# Patient Record
Sex: FEMALE | Race: OTHER | HISPANIC OR LATINO
[De-identification: names, ages, dates, MRNs, and addresses within clinical notes are randomized per-mention and may not be internally consistent; named-entity substitution may affect disease eponyms.]

---

## 2019-04-30 ENCOUNTER — APPOINTMENT (OUTPATIENT)
Dept: ANTEPARTUM | Facility: CLINIC | Age: 34
End: 2019-04-30
Payer: COMMERCIAL

## 2019-04-30 PROCEDURE — 76820 UMBILICAL ARTERY ECHO: CPT

## 2019-04-30 PROCEDURE — 76819 FETAL BIOPHYS PROFIL W/O NST: CPT

## 2019-04-30 PROCEDURE — 76817 TRANSVAGINAL US OBSTETRIC: CPT

## 2019-04-30 PROCEDURE — 76816 OB US FOLLOW-UP PER FETUS: CPT

## 2019-05-21 ENCOUNTER — APPOINTMENT (OUTPATIENT)
Dept: ANTEPARTUM | Facility: CLINIC | Age: 34
End: 2019-05-21
Payer: COMMERCIAL

## 2019-05-21 PROCEDURE — 76817 TRANSVAGINAL US OBSTETRIC: CPT

## 2019-05-21 PROCEDURE — 76819 FETAL BIOPHYS PROFIL W/O NST: CPT

## 2019-05-21 PROCEDURE — 76820 UMBILICAL ARTERY ECHO: CPT

## 2019-05-25 ENCOUNTER — OUTPATIENT (OUTPATIENT)
Dept: OUTPATIENT SERVICES | Facility: HOSPITAL | Age: 34
LOS: 1 days | End: 2019-05-25
Payer: COMMERCIAL

## 2019-05-25 DIAGNOSIS — O26.899 OTHER SPECIFIED PREGNANCY RELATED CONDITIONS, UNSPECIFIED TRIMESTER: ICD-10-CM

## 2019-05-25 DIAGNOSIS — Z3A.00 WEEKS OF GESTATION OF PREGNANCY NOT SPECIFIED: ICD-10-CM

## 2019-05-25 LAB — GLUCOSE BLDC GLUCOMTR-MCNC: 108 MG/DL — HIGH (ref 70–99)

## 2019-05-25 PROCEDURE — 82962 GLUCOSE BLOOD TEST: CPT

## 2019-05-25 PROCEDURE — 94760 N-INVAS EAR/PLS OXIMETRY 1: CPT

## 2019-05-25 PROCEDURE — 59025 FETAL NON-STRESS TEST: CPT

## 2019-05-25 PROCEDURE — 76819 FETAL BIOPHYS PROFIL W/O NST: CPT

## 2019-05-25 PROCEDURE — 99214 OFFICE O/P EST MOD 30 MIN: CPT

## 2019-05-29 ENCOUNTER — APPOINTMENT (OUTPATIENT)
Dept: ANTEPARTUM | Facility: CLINIC | Age: 34
End: 2019-05-29
Payer: COMMERCIAL

## 2019-05-29 PROCEDURE — 76818 FETAL BIOPHYS PROFILE W/NST: CPT

## 2019-05-29 PROCEDURE — 76820 UMBILICAL ARTERY ECHO: CPT

## 2019-06-03 ENCOUNTER — OUTPATIENT (OUTPATIENT)
Dept: OUTPATIENT SERVICES | Facility: HOSPITAL | Age: 34
LOS: 1 days | End: 2019-06-03
Payer: COMMERCIAL

## 2019-06-03 DIAGNOSIS — Z01.818 ENCOUNTER FOR OTHER PREPROCEDURAL EXAMINATION: ICD-10-CM

## 2019-06-03 LAB
BLD GP AB SCN SERPL QL: POSITIVE — SIGNIFICANT CHANGE UP
BLD GP AB SCN SERPL QL: POSITIVE — SIGNIFICANT CHANGE UP
HCT VFR BLD CALC: 39.5 % — SIGNIFICANT CHANGE UP (ref 34.5–45)
HGB BLD-MCNC: 13 G/DL — SIGNIFICANT CHANGE UP (ref 11.5–15.5)
MCHC RBC-ENTMCNC: 31.4 PG — SIGNIFICANT CHANGE UP (ref 27–34)
MCHC RBC-ENTMCNC: 32.9 GM/DL — SIGNIFICANT CHANGE UP (ref 32–36)
MCV RBC AUTO: 95.4 FL — SIGNIFICANT CHANGE UP (ref 80–100)
NRBC # BLD: 0 /100 WBCS — SIGNIFICANT CHANGE UP (ref 0–0)
PLATELET # BLD AUTO: 227 K/UL — SIGNIFICANT CHANGE UP (ref 150–400)
RBC # BLD: 4.14 M/UL — SIGNIFICANT CHANGE UP (ref 3.8–5.2)
RBC # FLD: 14.6 % — HIGH (ref 10.3–14.5)
RH IG SCN BLD-IMP: NEGATIVE — SIGNIFICANT CHANGE UP
RH IG SCN BLD-IMP: NEGATIVE — SIGNIFICANT CHANGE UP
WBC # BLD: 9.25 K/UL — SIGNIFICANT CHANGE UP (ref 3.8–10.5)
WBC # FLD AUTO: 9.25 K/UL — SIGNIFICANT CHANGE UP (ref 3.8–10.5)

## 2019-06-03 PROCEDURE — 86077 PHYS BLOOD BANK SERV XMATCH: CPT

## 2019-06-04 ENCOUNTER — INPATIENT (INPATIENT)
Facility: HOSPITAL | Age: 34
LOS: 3 days | Discharge: HOME | End: 2019-06-08
Attending: OBSTETRICS & GYNECOLOGY | Admitting: OBSTETRICS & GYNECOLOGY
Payer: COMMERCIAL

## 2019-06-04 ENCOUNTER — TRANSCRIPTION ENCOUNTER (OUTPATIENT)
Age: 34
End: 2019-06-04

## 2019-06-04 ENCOUNTER — RESULT REVIEW (OUTPATIENT)
Age: 34
End: 2019-06-04

## 2019-06-04 VITALS
TEMPERATURE: 97 F | DIASTOLIC BLOOD PRESSURE: 55 MMHG | HEART RATE: 70 BPM | OXYGEN SATURATION: 96 % | RESPIRATION RATE: 18 BRPM | SYSTOLIC BLOOD PRESSURE: 101 MMHG

## 2019-06-04 LAB
BASOPHILS # BLD AUTO: 0.03 K/UL — SIGNIFICANT CHANGE UP (ref 0–0.2)
BASOPHILS NFR BLD AUTO: 0.4 % — SIGNIFICANT CHANGE UP (ref 0–2)
BLD GP AB SCN SERPL QL: POSITIVE — SIGNIFICANT CHANGE UP
BLD GP AB SCN SERPL QL: POSITIVE — SIGNIFICANT CHANGE UP
EOSINOPHIL # BLD AUTO: 0.06 K/UL — SIGNIFICANT CHANGE UP (ref 0–0.5)
EOSINOPHIL NFR BLD AUTO: 0.8 % — SIGNIFICANT CHANGE UP (ref 0–6)
GLUCOSE BLDC GLUCOMTR-MCNC: 71 MG/DL — SIGNIFICANT CHANGE UP (ref 70–99)
HCT VFR BLD CALC: 40 % — SIGNIFICANT CHANGE UP (ref 34.5–45)
HGB BLD-MCNC: 13.3 G/DL — SIGNIFICANT CHANGE UP (ref 11.5–15.5)
IMM GRANULOCYTES NFR BLD AUTO: 1.3 % — SIGNIFICANT CHANGE UP (ref 0–1.5)
LYMPHOCYTES # BLD AUTO: 1.52 K/UL — SIGNIFICANT CHANGE UP (ref 1–3.3)
LYMPHOCYTES # BLD AUTO: 19.2 % — SIGNIFICANT CHANGE UP (ref 13–44)
MCHC RBC-ENTMCNC: 31.2 PG — SIGNIFICANT CHANGE UP (ref 27–34)
MCHC RBC-ENTMCNC: 33.3 GM/DL — SIGNIFICANT CHANGE UP (ref 32–36)
MCV RBC AUTO: 93.9 FL — SIGNIFICANT CHANGE UP (ref 80–100)
MONOCYTES # BLD AUTO: 0.92 K/UL — HIGH (ref 0–0.9)
MONOCYTES NFR BLD AUTO: 11.6 % — SIGNIFICANT CHANGE UP (ref 2–14)
NEUTROPHILS # BLD AUTO: 5.29 K/UL — SIGNIFICANT CHANGE UP (ref 1.8–7.4)
NEUTROPHILS NFR BLD AUTO: 66.7 % — SIGNIFICANT CHANGE UP (ref 43–77)
NRBC # BLD: 0 /100 WBCS — SIGNIFICANT CHANGE UP (ref 0–0)
PLATELET # BLD AUTO: 207 K/UL — SIGNIFICANT CHANGE UP (ref 150–400)
RBC # BLD: 4.26 M/UL — SIGNIFICANT CHANGE UP (ref 3.8–5.2)
RBC # FLD: 14.6 % — HIGH (ref 10.3–14.5)
RH IG SCN BLD-IMP: NEGATIVE — SIGNIFICANT CHANGE UP
RH IG SCN BLD-IMP: NEGATIVE — SIGNIFICANT CHANGE UP
T PALLIDUM AB TITR SER: NEGATIVE — SIGNIFICANT CHANGE UP
WBC # BLD: 7.92 K/UL — SIGNIFICANT CHANGE UP (ref 3.8–10.5)
WBC # FLD AUTO: 7.92 K/UL — SIGNIFICANT CHANGE UP (ref 3.8–10.5)

## 2019-06-04 PROCEDURE — 86870 RBC ANTIBODY IDENTIFICATION: CPT

## 2019-06-04 PROCEDURE — 86850 RBC ANTIBODY SCREEN: CPT

## 2019-06-04 PROCEDURE — 86900 BLOOD TYPING SEROLOGIC ABO: CPT

## 2019-06-04 PROCEDURE — 85027 COMPLETE CBC AUTOMATED: CPT

## 2019-06-04 PROCEDURE — 86780 TREPONEMA PALLIDUM: CPT

## 2019-06-04 PROCEDURE — 86901 BLOOD TYPING SEROLOGIC RH(D): CPT

## 2019-06-04 PROCEDURE — 86880 COOMBS TEST DIRECT: CPT

## 2019-06-04 RX ORDER — NALOXONE HYDROCHLORIDE 4 MG/.1ML
0.1 SPRAY NASAL
Refills: 0 | Status: DISCONTINUED | OUTPATIENT
Start: 2019-06-04 | End: 2019-06-08

## 2019-06-04 RX ORDER — CEFAZOLIN SODIUM 1 G
2000 VIAL (EA) INJECTION ONCE
Refills: 0 | Status: COMPLETED | OUTPATIENT
Start: 2019-06-04 | End: 2019-06-04

## 2019-06-04 RX ORDER — MAGNESIUM HYDROXIDE 400 MG/1
30 TABLET, CHEWABLE ORAL
Refills: 0 | Status: DISCONTINUED | OUTPATIENT
Start: 2019-06-04 | End: 2019-06-08

## 2019-06-04 RX ORDER — KETOROLAC TROMETHAMINE 30 MG/ML
30 SYRINGE (ML) INJECTION EVERY 6 HOURS
Refills: 0 | Status: DISCONTINUED | OUTPATIENT
Start: 2019-06-04 | End: 2019-06-05

## 2019-06-04 RX ORDER — DIPHENHYDRAMINE HCL 50 MG
25 CAPSULE ORAL EVERY 6 HOURS
Refills: 0 | Status: DISCONTINUED | OUTPATIENT
Start: 2019-06-04 | End: 2019-06-08

## 2019-06-04 RX ORDER — SODIUM CHLORIDE 9 MG/ML
1000 INJECTION, SOLUTION INTRAVENOUS
Refills: 0 | Status: DISCONTINUED | OUTPATIENT
Start: 2019-06-04 | End: 2019-06-08

## 2019-06-04 RX ORDER — OXYCODONE HYDROCHLORIDE 5 MG/1
5 TABLET ORAL ONCE
Refills: 0 | Status: DISCONTINUED | OUTPATIENT
Start: 2019-06-04 | End: 2019-06-08

## 2019-06-04 RX ORDER — OXYCODONE HYDROCHLORIDE 5 MG/1
5 TABLET ORAL
Refills: 0 | Status: COMPLETED | OUTPATIENT
Start: 2019-06-04 | End: 2019-06-11

## 2019-06-04 RX ORDER — IBUPROFEN 200 MG
600 TABLET ORAL EVERY 6 HOURS
Refills: 0 | Status: COMPLETED | OUTPATIENT
Start: 2019-06-04 | End: 2020-05-02

## 2019-06-04 RX ORDER — HEPARIN SODIUM 5000 [USP'U]/ML
5000 INJECTION INTRAVENOUS; SUBCUTANEOUS EVERY 12 HOURS
Refills: 0 | Status: DISCONTINUED | OUTPATIENT
Start: 2019-06-05 | End: 2019-06-08

## 2019-06-04 RX ORDER — TETANUS TOXOID, REDUCED DIPHTHERIA TOXOID AND ACELLULAR PERTUSSIS VACCINE, ADSORBED 5; 2.5; 8; 8; 2.5 [IU]/.5ML; [IU]/.5ML; UG/.5ML; UG/.5ML; UG/.5ML
0.5 SUSPENSION INTRAMUSCULAR ONCE
Refills: 0 | Status: DISCONTINUED | OUTPATIENT
Start: 2019-06-04 | End: 2019-06-08

## 2019-06-04 RX ORDER — ONDANSETRON 8 MG/1
4 TABLET, FILM COATED ORAL EVERY 6 HOURS
Refills: 0 | Status: DISCONTINUED | OUTPATIENT
Start: 2019-06-04 | End: 2019-06-08

## 2019-06-04 RX ORDER — FAMOTIDINE 10 MG/ML
20 INJECTION INTRAVENOUS ONCE
Refills: 0 | Status: DISCONTINUED | OUTPATIENT
Start: 2019-06-04 | End: 2019-06-08

## 2019-06-04 RX ORDER — SIMETHICONE 80 MG/1
80 TABLET, CHEWABLE ORAL EVERY 4 HOURS
Refills: 0 | Status: DISCONTINUED | OUTPATIENT
Start: 2019-06-04 | End: 2019-06-08

## 2019-06-04 RX ORDER — LANOLIN
1 OINTMENT (GRAM) TOPICAL EVERY 6 HOURS
Refills: 0 | Status: DISCONTINUED | OUTPATIENT
Start: 2019-06-04 | End: 2019-06-08

## 2019-06-04 RX ORDER — OXYTOCIN 10 UNIT/ML
333.33 VIAL (ML) INJECTION
Qty: 20 | Refills: 0 | Status: DISCONTINUED | OUTPATIENT
Start: 2019-06-04 | End: 2019-06-08

## 2019-06-04 RX ORDER — SODIUM CHLORIDE 9 MG/ML
1000 INJECTION, SOLUTION INTRAVENOUS ONCE
Refills: 0 | Status: DISCONTINUED | OUTPATIENT
Start: 2019-06-04 | End: 2019-06-08

## 2019-06-04 RX ORDER — METOCLOPRAMIDE HCL 10 MG
10 TABLET ORAL ONCE
Refills: 0 | Status: DISCONTINUED | OUTPATIENT
Start: 2019-06-04 | End: 2019-06-08

## 2019-06-04 RX ORDER — DOCUSATE SODIUM 100 MG
100 CAPSULE ORAL
Refills: 0 | Status: DISCONTINUED | OUTPATIENT
Start: 2019-06-04 | End: 2019-06-08

## 2019-06-04 RX ORDER — ACETAMINOPHEN 500 MG
975 TABLET ORAL EVERY 6 HOURS
Refills: 0 | Status: DISCONTINUED | OUTPATIENT
Start: 2019-06-04 | End: 2019-06-08

## 2019-06-04 RX ORDER — CITRIC ACID/SODIUM CITRATE 300-500 MG
30 SOLUTION, ORAL ORAL ONCE
Refills: 0 | Status: COMPLETED | OUTPATIENT
Start: 2019-06-04 | End: 2019-06-04

## 2019-06-04 RX ORDER — DEXAMETHASONE 0.5 MG/5ML
4 ELIXIR ORAL EVERY 6 HOURS
Refills: 0 | Status: DISCONTINUED | OUTPATIENT
Start: 2019-06-04 | End: 2019-06-08

## 2019-06-04 RX ORDER — GLYCERIN ADULT
1 SUPPOSITORY, RECTAL RECTAL AT BEDTIME
Refills: 0 | Status: DISCONTINUED | OUTPATIENT
Start: 2019-06-04 | End: 2019-06-08

## 2019-06-04 RX ORDER — MORPHINE SULFATE 50 MG/1
0.3 CAPSULE, EXTENDED RELEASE ORAL ONCE
Refills: 0 | Status: DISCONTINUED | OUTPATIENT
Start: 2019-06-04 | End: 2019-06-08

## 2019-06-04 RX ADMIN — Medication 30 MILLIGRAM(S): at 19:38

## 2019-06-04 RX ADMIN — Medication 100 MILLIGRAM(S): at 18:35

## 2019-06-04 RX ADMIN — Medication 30 MILLILITER(S): at 18:35

## 2019-06-04 NOTE — DISCHARGE NOTE OB - CARE PROVIDER_API CALL
Dayron Soliz)  Obstetrics and Gynecology  [address] [place] [id number]  Phone: [phone]  Fax: [phone]  Follow Up Time:

## 2019-06-04 NOTE — DISCHARGE NOTE OB - CARE PLAN
Goal:	Home  Assessment and plan of treatment:	Nothing per vagina no  heagvfy liftign for  six  weeks  no  sex  no  tub  baths  no swimming Principal Discharge DX:	Postpartum state  Goal:	Home  Assessment and plan of treatment:	Meeting all postpartum milestones. No heavy lifting. Pelvic rest until cleared. Follow-up with obstetrician in 1-2 weeks.

## 2019-06-04 NOTE — DISCHARGE NOTE OB - PATIENT PORTAL LINK FT
You can access the [facility] Patient Portal, offered by [facility], by registering with the following website: [email or web address]

## 2019-06-04 NOTE — DISCHARGE NOTE OB - PLAN OF CARE
Home Nothing per vagina no  heagvfy liftign for  six  weeks  no  sex  no  tub  baths  no swimming Meeting all postpartum milestones. No heavy lifting. Pelvic rest until cleared. Follow-up with obstetrician in 1-2 weeks.

## 2019-06-05 LAB
BASOPHILS # BLD AUTO: 0.03 K/UL — SIGNIFICANT CHANGE UP (ref 0–0.2)
BASOPHILS NFR BLD AUTO: 0.3 % — SIGNIFICANT CHANGE UP (ref 0–2)
EOSINOPHIL # BLD AUTO: 0.07 K/UL — SIGNIFICANT CHANGE UP (ref 0–0.5)
EOSINOPHIL NFR BLD AUTO: 0.8 % — SIGNIFICANT CHANGE UP (ref 0–6)
HCT VFR BLD CALC: 35.1 % — SIGNIFICANT CHANGE UP (ref 34.5–45)
HGB BLD-MCNC: 11.4 G/DL — LOW (ref 11.5–15.5)
IMM GRANULOCYTES NFR BLD AUTO: 0.5 % — SIGNIFICANT CHANGE UP (ref 0–1.5)
LYMPHOCYTES # BLD AUTO: 1.31 K/UL — SIGNIFICANT CHANGE UP (ref 1–3.3)
LYMPHOCYTES # BLD AUTO: 14.1 % — SIGNIFICANT CHANGE UP (ref 13–44)
MCHC RBC-ENTMCNC: 31.2 PG — SIGNIFICANT CHANGE UP (ref 27–34)
MCHC RBC-ENTMCNC: 32.5 GM/DL — SIGNIFICANT CHANGE UP (ref 32–36)
MCV RBC AUTO: 96.2 FL — SIGNIFICANT CHANGE UP (ref 80–100)
MONOCYTES # BLD AUTO: 0.93 K/UL — HIGH (ref 0–0.9)
MONOCYTES NFR BLD AUTO: 10 % — SIGNIFICANT CHANGE UP (ref 2–14)
NEUTROPHILS # BLD AUTO: 6.93 K/UL — SIGNIFICANT CHANGE UP (ref 1.8–7.4)
NEUTROPHILS NFR BLD AUTO: 74.3 % — SIGNIFICANT CHANGE UP (ref 43–77)
NRBC # BLD: 0 /100 WBCS — SIGNIFICANT CHANGE UP (ref 0–0)
PLATELET # BLD AUTO: 174 K/UL — SIGNIFICANT CHANGE UP (ref 150–400)
RBC # BLD: 3.65 M/UL — LOW (ref 3.8–5.2)
RBC # FLD: 14.2 % — SIGNIFICANT CHANGE UP (ref 10.3–14.5)
T PALLIDUM AB TITR SER: NEGATIVE — SIGNIFICANT CHANGE UP
WBC # BLD: 9.32 K/UL — SIGNIFICANT CHANGE UP (ref 3.8–10.5)
WBC # FLD AUTO: 9.32 K/UL — SIGNIFICANT CHANGE UP (ref 3.8–10.5)

## 2019-06-05 RX ORDER — IBUPROFEN 200 MG
600 TABLET ORAL EVERY 6 HOURS
Refills: 0 | Status: DISCONTINUED | OUTPATIENT
Start: 2019-06-05 | End: 2019-06-08

## 2019-06-05 RX ORDER — ZINC OXIDE 200 MG/G
1 OINTMENT TOPICAL
Refills: 0 | Status: DISCONTINUED | OUTPATIENT
Start: 2019-06-05 | End: 2019-06-08

## 2019-06-05 RX ADMIN — Medication 975 MILLIGRAM(S): at 01:08

## 2019-06-05 RX ADMIN — Medication 975 MILLIGRAM(S): at 15:01

## 2019-06-05 RX ADMIN — Medication 25 MILLIGRAM(S): at 06:27

## 2019-06-05 RX ADMIN — HEPARIN SODIUM 5000 UNIT(S): 5000 INJECTION INTRAVENOUS; SUBCUTANEOUS at 17:43

## 2019-06-05 RX ADMIN — Medication 975 MILLIGRAM(S): at 14:31

## 2019-06-05 RX ADMIN — ZINC OXIDE 1 APPLICATION(S): 200 OINTMENT TOPICAL at 14:31

## 2019-06-05 RX ADMIN — Medication 975 MILLIGRAM(S): at 07:19

## 2019-06-05 RX ADMIN — Medication 30 MILLIGRAM(S): at 11:02

## 2019-06-05 RX ADMIN — Medication 30 MILLIGRAM(S): at 18:14

## 2019-06-05 RX ADMIN — Medication 975 MILLIGRAM(S): at 06:23

## 2019-06-05 RX ADMIN — Medication 975 MILLIGRAM(S): at 21:04

## 2019-06-05 RX ADMIN — Medication 30 MILLIGRAM(S): at 04:06

## 2019-06-05 RX ADMIN — Medication 30 MILLIGRAM(S): at 17:44

## 2019-06-05 RX ADMIN — HEPARIN SODIUM 5000 UNIT(S): 5000 INJECTION INTRAVENOUS; SUBCUTANEOUS at 06:23

## 2019-06-05 RX ADMIN — Medication 30 MILLIGRAM(S): at 03:14

## 2019-06-05 RX ADMIN — Medication 975 MILLIGRAM(S): at 22:00

## 2019-06-05 RX ADMIN — Medication 30 MILLIGRAM(S): at 10:32

## 2019-06-05 RX ADMIN — SIMETHICONE 80 MILLIGRAM(S): 80 TABLET, CHEWABLE ORAL at 06:22

## 2019-06-05 RX ADMIN — Medication 975 MILLIGRAM(S): at 00:07

## 2019-06-05 NOTE — LACTATION INITIAL EVALUATION - PRO FEM REPRO BREAST PUMP YN
Problem: [place] (,NICU)  Goal: Signs and Symptoms of Listed Potential Problems Will be Absent or Manageable ()  Outcome: Ongoing (interventions implemented as appropriate)    17      Problems Assessed () all   Problems Present ([place]) none         Problem: Patient Care Overview (Infant)  Goal: Plan of Care Review  Outcome: Ongoing (interventions implemented as appropriate)    17   Coping/Psychosocial Response   Care Plan Reviewed With mother   Patient Care Overview   Progress improving   Outcome Evaluation   Outcome Summary/Follow up Plan stable. bottling well. questions answered.             yes/Provided

## 2019-06-06 RX ORDER — OXYCODONE HYDROCHLORIDE 5 MG/1
5 TABLET ORAL
Refills: 0 | Status: DISCONTINUED | OUTPATIENT
Start: 2019-06-06 | End: 2019-06-08

## 2019-06-06 RX ORDER — ZINC OXIDE 200 MG/G
1 OINTMENT TOPICAL
Refills: 0 | Status: DISCONTINUED | OUTPATIENT
Start: 2019-06-06 | End: 2019-06-08

## 2019-06-06 RX ADMIN — OXYCODONE HYDROCHLORIDE 5 MILLIGRAM(S): 5 TABLET ORAL at 22:32

## 2019-06-06 RX ADMIN — Medication 100 MILLIGRAM(S): at 00:20

## 2019-06-06 RX ADMIN — Medication 975 MILLIGRAM(S): at 22:25

## 2019-06-06 RX ADMIN — Medication 975 MILLIGRAM(S): at 15:12

## 2019-06-06 RX ADMIN — Medication 600 MILLIGRAM(S): at 01:30

## 2019-06-06 RX ADMIN — Medication 975 MILLIGRAM(S): at 09:01

## 2019-06-06 RX ADMIN — HEPARIN SODIUM 5000 UNIT(S): 5000 INJECTION INTRAVENOUS; SUBCUTANEOUS at 06:17

## 2019-06-06 RX ADMIN — Medication 600 MILLIGRAM(S): at 00:20

## 2019-06-06 RX ADMIN — Medication 600 MILLIGRAM(S): at 12:33

## 2019-06-06 RX ADMIN — ZINC OXIDE 1 APPLICATION(S): 200 OINTMENT TOPICAL at 12:04

## 2019-06-06 RX ADMIN — Medication 600 MILLIGRAM(S): at 07:06

## 2019-06-06 RX ADMIN — ZINC OXIDE 1 APPLICATION(S): 200 OINTMENT TOPICAL at 18:10

## 2019-06-06 RX ADMIN — Medication 975 MILLIGRAM(S): at 03:05

## 2019-06-06 RX ADMIN — Medication 600 MILLIGRAM(S): at 12:03

## 2019-06-06 RX ADMIN — Medication 975 MILLIGRAM(S): at 14:42

## 2019-06-06 RX ADMIN — Medication 975 MILLIGRAM(S): at 09:31

## 2019-06-06 RX ADMIN — Medication 975 MILLIGRAM(S): at 03:50

## 2019-06-06 RX ADMIN — HEPARIN SODIUM 5000 UNIT(S): 5000 INJECTION INTRAVENOUS; SUBCUTANEOUS at 18:06

## 2019-06-06 RX ADMIN — Medication 975 MILLIGRAM(S): at 23:40

## 2019-06-06 RX ADMIN — OXYCODONE HYDROCHLORIDE 5 MILLIGRAM(S): 5 TABLET ORAL at 21:36

## 2019-06-06 RX ADMIN — Medication 100 MILLIGRAM(S): at 21:36

## 2019-06-06 RX ADMIN — SIMETHICONE 80 MILLIGRAM(S): 80 TABLET, CHEWABLE ORAL at 00:20

## 2019-06-06 RX ADMIN — Medication 600 MILLIGRAM(S): at 18:36

## 2019-06-06 RX ADMIN — Medication 600 MILLIGRAM(S): at 06:17

## 2019-06-06 RX ADMIN — Medication 600 MILLIGRAM(S): at 18:06

## 2019-06-07 LAB — SURGICAL PATHOLOGY STUDY: SIGNIFICANT CHANGE UP

## 2019-06-07 RX ADMIN — Medication 600 MILLIGRAM(S): at 07:36

## 2019-06-07 RX ADMIN — OXYCODONE HYDROCHLORIDE 5 MILLIGRAM(S): 5 TABLET ORAL at 21:56

## 2019-06-07 RX ADMIN — Medication 975 MILLIGRAM(S): at 05:02

## 2019-06-07 RX ADMIN — Medication 600 MILLIGRAM(S): at 12:30

## 2019-06-07 RX ADMIN — HEPARIN SODIUM 5000 UNIT(S): 5000 INJECTION INTRAVENOUS; SUBCUTANEOUS at 06:51

## 2019-06-07 RX ADMIN — Medication 975 MILLIGRAM(S): at 14:59

## 2019-06-07 RX ADMIN — Medication 975 MILLIGRAM(S): at 04:05

## 2019-06-07 RX ADMIN — Medication 600 MILLIGRAM(S): at 02:10

## 2019-06-07 RX ADMIN — Medication 975 MILLIGRAM(S): at 20:16

## 2019-06-07 RX ADMIN — Medication 975 MILLIGRAM(S): at 22:40

## 2019-06-07 RX ADMIN — Medication 975 MILLIGRAM(S): at 15:55

## 2019-06-07 RX ADMIN — Medication 600 MILLIGRAM(S): at 06:51

## 2019-06-07 RX ADMIN — Medication 600 MILLIGRAM(S): at 11:44

## 2019-06-07 RX ADMIN — OXYCODONE HYDROCHLORIDE 5 MILLIGRAM(S): 5 TABLET ORAL at 22:40

## 2019-06-07 RX ADMIN — Medication 600 MILLIGRAM(S): at 01:40

## 2019-06-07 RX ADMIN — Medication 975 MILLIGRAM(S): at 09:00

## 2019-06-07 RX ADMIN — Medication 975 MILLIGRAM(S): at 10:00

## 2019-06-07 RX ADMIN — Medication 600 MILLIGRAM(S): at 18:18

## 2019-06-07 RX ADMIN — ZINC OXIDE 1 APPLICATION(S): 200 OINTMENT TOPICAL at 15:11

## 2019-06-08 VITALS
OXYGEN SATURATION: 98 % | DIASTOLIC BLOOD PRESSURE: 77 MMHG | HEART RATE: 77 BPM | RESPIRATION RATE: 18 BRPM | SYSTOLIC BLOOD PRESSURE: 120 MMHG | TEMPERATURE: 97 F

## 2019-06-08 RX ADMIN — Medication 600 MILLIGRAM(S): at 06:25

## 2019-06-08 RX ADMIN — Medication 600 MILLIGRAM(S): at 00:23

## 2019-06-08 RX ADMIN — Medication 975 MILLIGRAM(S): at 04:06

## 2019-06-08 RX ADMIN — Medication 600 MILLIGRAM(S): at 01:23

## 2019-06-08 RX ADMIN — HEPARIN SODIUM 5000 UNIT(S): 5000 INJECTION INTRAVENOUS; SUBCUTANEOUS at 06:25

## 2019-06-08 RX ADMIN — Medication 975 MILLIGRAM(S): at 03:05

## 2019-06-08 NOTE — PROGRESS NOTE ADULT - SUBJECTIVE AND OBJECTIVE BOX
Patient evaluated at bedside. She has been ambulating without assistance to NICU, voiding spontaneously, passing gas, tolerating regular diet.   Baby is currently in NICU.  She reports pain is well controlled with motrin and tylenol  She denies headache, dizziness, chest pain, palpitations, shortness of breathe, nausea, vomiting or heavy vaginal bleeding.      Physical Exam:  Vital Signs Last 24 Hrs  T(C): 36.7 (05 Jun 2019 10:41), Max: 36.9 (05 Jun 2019 05:41)  T(F): 98.1 (05 Jun 2019 10:41), Max: 98.4 (05 Jun 2019 05:41)  HR: 70 (05 Jun 2019 10:41) (60 - 81)  BP: 92/58 (05 Jun 2019 10:41) (91/57 - 124/59)  BP(mean): --  RR: 18 (05 Jun 2019 10:41) (17 - 18)  SpO2: 97% (05 Jun 2019 10:41) (96% - 100%)    Constitutional: Alert & Oriented x3, No acute distress, cooperative   Gastrointestinal: soft, mildly tender, positive bowel sounds, no rebound or guarding; uterus firm at midline,  Incision: steristrips in place; area clean, dry and intact; no erythema or induration   : ochia WNL   Extremities: no calf tenderness or swelling                          11.4   9.32  )-----------( 174      ( 05 Jun 2019 07:31 )             35.1
 Section Operative Note      Pre-Op Diagnosis:  Placenta  previa, polyhydramnios, a1  gdm,  Unable  to locate  FH after spinal  anesthsia ,  non reassuring fetal  status     Post-Op Diagnosis:Same       Time Out: 	[x ] Performed		[ ]Not Performed:  Procedure: 	 Section: 	[ ] Repeat 	#_______	[x ] Primary         [ ]Emergency	        [ ]Other____________:  Uterine incision:         [ x]Low Transverse C/S	[ ]Low Vertical C/S           [ ]Classical C/S							  Additional Procedures: 	[ ] Bilateral Tubal Ligation.  Type:______________  Other:	[ ]Lysis of Adhesions   	[ ]C-Hysterectomy          [ ]Other__________________:  Surgeon:  Dr. Soliz                                                   .	Assist:   ____R Lindsey_______________.                                                                             .   Anesthesia: ______Farhatviniskicolby________________________	Type: [ ]Epidural  [ x] Spinal   [ ] General	[ ]Other:  IV Fluids:IVRL 2000cc  20 upitocin  2 g  ancef 		Urine Output:	75cc	Link:  [ x] Yes [ ]No [ ] Other:  EBL:    	900cc	  Delivery findings:    [ x] Live 	[ ]Non-Viable: 	[ ]MALE   [x ]FEMALE, Infant. APGAR    9          AND    9             .  [x ] Peds at delivery.  [ ]MULTIPLE GESTATION -NOTES:      POSITION:      LOP                  PRESENTATION            VTX                    .  DELIVERED BY:  	[ x]HEAD 		[ ]BREECH 	[ ]OTHER:  		[x ]MANUAL 		[x ]Failed VACUUM	[ ] OTHER:	  PLACENTA: 	[x ]Removed	[ x]Uterus explored		[x ]Empty		[ ]Other 		  		UTERUS:  	[ x]Normal		[ ]Fibroids		[ ] Other:  ADNEXA: 	[ x]Right Normal	[ ]Other:  	[x ]Left Normal	[ ]Other :  PELVIS:	[ x]Normal		[ ]Adhesions [ ]Mild  [ ]Moderate [ ]Severe  Procedure:  	Patient in supine position.    Skin Incision	[x ]Pfannenstiel	[ ]Other:			[ ]Old scar  removal.  		Fascial Incision	[ x]Transverse 	[ ]Other:		  Peritoneal incision	[x ]Performed	[ x]Vertical  [ ]Other:		[ ]Lysis of Adhesions  		Bladder Flap	[ ]Created	[ ]Not Created  		Uterine Incision	[x ]Low transverse	[ ]Low Vertical	[ ]Classical  [ ]Other:   	Uterine Repair	[ x]_#_1_______Layers-Using__1 chromic______________ sutures 	[x ] hemostasis  excellent.  				[ ]Other:                       .                      Abdominal Cavity	[x ]Cleared of clots and debris  Rectus  Muscles  	Reapproximated [x ]Yes Using__1 chromic______________ sutures. [ ]Not Re- Approximated.  Fascial Reapproximation 	[ x]UsingUsing___1 Vicril_____________ sutures [ ]Other:                                 .	  Subcutaneous Tissue 	[x ]Irrigated  and hemostasis assured:[x ]Reapproximated Using____2-0____________ sutures.  Skin Reapproximation:	[x ]Subcuticular Using________________ sutures [x ] Insorb Staples   [ ]Skin Staples [ ]Other:  Dressing applied  and Patient to RR in  [x ] Stable [ ] Other ;                         condition.	  End of Operation;	[x ] Sponge/lap/needle count correct.  [ ] Not correct.  [ ]Not Done [ ]X-ray=Clear  Pathology:	[ x]Placenta.   	[ ]Fallopian Tube Portions [ ]Right [ ]Left.	[ ]Other:                                             	Complications/Attending’s Notes:	[ ] None.  	[x ] Other:             Discsssed  case  wiht    and patient,  discssed that  we  were  unable  to find  FH  after  Spinamd  and  that  emergancy   section was perfomed                                                            [ ]CONTINUATION OF NOTES NEXT PAGE:
OB/GYN ATTENDING POSTOP ROUNDS                                    Subjective:  33yFemale  Complaints: [x ]None	[ ]Other:      Objective:  		Vital Signs:  T(C): 36.9 (19 @ 05:41), Max: 36.9 (19 @ 05:41)  HR: 81 (19 @ 05:41) (60 - 81)  BP: 93/58 (19 @ 05:41) (91/57 - 124/59)  RR: 18 (19 @ 05:41) (17 - 18)  SpO2: 97% (19 @ 05:41) (96% - 100%)  Wt(kg): --     @ 07:01  -   @ 07:00  --------------------------------------------------------  IN: 2000 mL / OUT: 2390 mL / NET: -390 mL     @ 07:01  -   @ 08:19  --------------------------------------------------------  IN: 0 mL / OUT: 150 mL / NET: -150 mL        		General:      NAD 	[x ] Yes 	[ ]No,	 A&O X3	[ x] Yes  [ ] No			  		Abdomen:   Palpation  	[x ]Normal	[ ]Other:	  	   Incision:              [x  ]Intact	   [ x] Clean	[x ] Dry    [   ]other:  BS		[ x]Normal 	[ ]Other:  			  LE:  	Calf tenderness    	[ x]None		[x ]No  Sign of DVT	[ ]Other:  		Lochia:	 		[ x]Normal,	[ ]Other:  		Labs:	                      11.4   9.32  )-----------( 174      ( 2019 07:31 )             35.1   	       Assessment:  			[x ] Post-op  Section  			Day #_1_______  				[ ] Other:     	Plan:	1.  Supportive Care		[ ]Other:                                           2. Pain:		[ x] Well Controlled 	[ ] Other:	           	     	3. Misc.		[ x]Continue current care                                            4. Discharge home on POD 3/4 if stable	[ ] Other:  		  Notes:			[x ] None			[ ] Other:      													Dayron Soliz MD [phone]
Patient evaluated at bedside.   She reports pain is well controlled with OPM.  She has been ambulating without assistance, voiding spontaneously, passing gas, tolerating regular diet and is breastfeeding.    She denies HA, dizziness, CP, palpitations, SOB, n/v, or heavy vaginal bleeding.    Physical Exam:  Vital Signs Last 24 Hrs  T(C): 36.6 (08 Jun 2019 01:51), Max: 36.7 (07 Jun 2019 14:00)  T(F): 97.8 (08 Jun 2019 01:51), Max: 98 (07 Jun 2019 14:00)  HR: 64 (08 Jun 2019 01:51) (64 - 72)  BP: 114/67 (08 Jun 2019 01:51) (114/67 - 117/58)  BP(mean): --  RR: 16 (08 Jun 2019 01:51) (16 - 18)  SpO2: 97% (08 Jun 2019 01:51) (97% - 100%)    Gen: NAD  Abd: + BS, soft, nontender, nondistended, no rebound or guarding  Incision clean, dry and intact  uterus firm at midline  : lochia WNL  Extremities: no swelling or calf tenderness        MEDICATIONS  (STANDING):  acetaminophen   Tablet .. 975 milliGRAM(s) Oral every 6 hours  diphtheria/tetanus/pertussis (acellular) Vaccine (ADAcel) 0.5 milliLiter(s) IntraMuscular once  famotidine Injectable 20 milliGRAM(s) IV Push once  heparin  Injectable 5000 Unit(s) SubCutaneous every 12 hours  ibuprofen  Tablet. 600 milliGRAM(s) Oral every 6 hours  lactated ringers Bolus 1000 milliLiter(s) IV Bolus once  lactated ringers. 1000 milliLiter(s) (125 mL/Hr) IV Continuous <Continuous>  lactated ringers. 1000 milliLiter(s) (125 mL/Hr) IV Continuous <Continuous>  morphine PF Spinal 0.3 milliGRAM(s) IntraThecal. once  oxytocin Infusion 333.333 milliUNIT(s)/Min (1000 mL/Hr) IV Continuous <Continuous>  oxytocin Infusion 333.333 milliUNIT(s)/Min (1000 mL/Hr) IV Continuous <Continuous>  zinc oxide 20% Ointment 1 Application(s) Topical four times a day    MEDICATIONS  (PRN):  dexamethasone  Injectable 4 milliGRAM(s) IV Push every 6 hours PRN Nausea  diphenhydrAMINE 25 milliGRAM(s) Oral every 6 hours PRN Itching  docusate sodium 100 milliGRAM(s) Oral two times a day PRN Stool softening  glycerin Suppository - Adult 1 Suppository(s) Rectal at bedtime PRN Constipation  lanolin Ointment 1 Application(s) Topical every 6 hours PRN Sore Nipples  magnesium hydroxide Suspension 30 milliLiter(s) Oral two times a day PRN Constipation  metoclopramide Injectable 10 milliGRAM(s) IV Push once PRN Nausea and/or Vomiting  naloxone Injectable 0.1 milliGRAM(s) IV Push every 3 minutes PRN For ANY of the following changes in patient status:  A. Breaths Per Minute LESS THAN 10, B. Oxygen saturation LESS THAN 90%, C. Sedation score of 6 for Stop After: 4 Times  ondansetron Injectable 4 milliGRAM(s) IV Push every 6 hours PRN Nausea  oxyCODONE    IR 5 milliGRAM(s) Oral every 3 hours PRN Moderate Pain (4 - 6)  oxyCODONE    IR 5 milliGRAM(s) Oral once PRN Severe Pain (7 - 10)  simethicone 80 milliGRAM(s) Chew every 4 hours PRN Gas  zinc oxide 40% Ointment 1 Application(s) Topical every 3 hours PRN pain
Patient evaluated at bedside.   She reports pain is well controlled with OPM.  She has been ambulating without assistance, voiding spontaneously, passing gas, tolerating regular diet and is breastfeeding.    She denies HA, dizziness, CP, palpitations, SOB, n/v, or heavy vaginal bleeding.    Physical Exam:  Vital Signs Last 24 Hrs  T(C): 36.7 (06 Jun 2019 06:00), Max: 37.1 (05 Jun 2019 15:00)  T(F): 98 (06 Jun 2019 06:00), Max: 98.8 (05 Jun 2019 15:00)  HR: 69 (06 Jun 2019 06:00) (69 - 97)  BP: 116/75 (06 Jun 2019 06:00) (91/56 - 116/75)  BP(mean): --  RR: 18 (06 Jun 2019 06:00) (17 - 18)  SpO2: 97% (06 Jun 2019 06:00) (97% - 99%)    Gen: NAD  Abd: + BS, soft, nontender, nondistended, no rebound or guarding  Incision clean, dry and intact  uterus firm at midline  : lochia WNL  Extremities: no swelling or calf tenderness                          11.4   9.32  )-----------( 174      ( 05 Jun 2019 07:31 )             35.1
Patient evaluated at bedside.   She reports pain is well controlled with OPM.  She has been ambulating without assistance, voiding spontaneously, passing gas, tolerating regular diet and is breastfeeding.    She denies HA, dizziness, CP, palpitations, SOB, n/v, or heavy vaginal bleeding.    Physical Exam:  Vital Signs Last 24 Hrs  T(C): 36.8 (07 Jun 2019 05:47), Max: 36.8 (07 Jun 2019 05:47)  T(F): 98.2 (07 Jun 2019 05:47), Max: 98.2 (07 Jun 2019 05:47)  HR: 61 (07 Jun 2019 05:47) (59 - 77)  BP: 119/69 (07 Jun 2019 05:47) (91/56 - 119/69)  BP(mean): --  RR: 18 (07 Jun 2019 05:47) (16 - 18)  SpO2: 100% (07 Jun 2019 05:47) (97% - 100%)    Gen: NAD  Abd: + BS, soft, nontender, nondistended, no rebound or guarding  Incision clean, dry and intact  uterus firm at midline  : lochia WNL  Extremities: no swelling or calf tenderness
Patient evaluated at bedside.   She reports pain is well controlled.  Voiding  Passing Flatus  OOB to chair    She denies HA, dizziness, CP, palpitations, SOB, n/v, or heavy vaginal bleeding.    Physical Exam:  Vital Signs Last 24 Hrs  T(C): 37.1 (05 Jun 2019 15:00), Max: 37.1 (05 Jun 2019 15:00)  T(F): 98.8 (05 Jun 2019 15:00), Max: 98.8 (05 Jun 2019 15:00)  HR: 70 (05 Jun 2019 15:00) (60 - 81)  BP: 91/56 (05 Jun 2019 15:00) (91/56 - 124/59)  BP(mean): --  RR: 18 (05 Jun 2019 15:00) (17 - 18)  SpO2: 98% (05 Jun 2019 15:00) (96% - 100%)    Gen: NAD  Abd: + BS, soft, nontender, nondistended, no rebound or guarding  Incision clean, dry and intact  uterus firm at midline  : lochia WNL  Extremities: no swelling or calf tenderness                          11.4   9.32  )-----------( 174      ( 05 Jun 2019 07:31 )             35.1

## 2019-06-08 NOTE — PROGRESS NOTE ADULT - ASSESSMENT
A/P   33y  s/p  section, POD #1, stable  -  Errythema from bandage removal: Zinc oxide ordered  -  Pain: PO motrin, percocets for severe pain PRN  -  Post-operatively labs: post-op Hgb  -  GI: Regular diet   -  : voiding spontaneously   -  DVT prophylaxis: ambulation   -  Dispo: POD 3 or 4
33y Female POD#1 s/p C/S for placenta previa, Uncomplicated                                     Post op hb 11.4  1. Neuro/Pain:  OPM  2  CV:  VS per routine  3. Pulm: Encourage ISS & Ambulation  4. GI:  Advance as kobe  5. : Voiding  6. DVT ppx: SCDs, SQH 5000 mg BID  7. Dispo: POD #3 or #4
33y Female POD#2   s/p C/S, Uncomplicated                                     post op hb 11.4  1. Neuro/Pain:  OPM  2  CV:  VS per routine  3. Pulm: Encourage ISS & Ambulation  4. GI:  Reg  5. : Voiding  6. DVT ppx: SCDs, SQH 5000 mg BID  7. Dispo: POD #3 or #4
33y Female POD#3 s/p C/S for placenta previa, Uncomplicated                                     post op hb 11.4  1. Neuro/Pain:  OPM  2  CV:  VS per routine  3. Pulm: Encourage ISS & Ambulation  4. GI:  Reg  5. : Voiding  6. DVT ppx: SCDs, SQH 5000 mg BID  7. Dispo: POD #3 or #4
34y Female POD#4    s/p C/S, Uncomplicated                                       1. Neuro/Pain:  OPM  2  CV:  VS per routine  3. Pulm: Encourage ISS & Ambulation  4. GI:  Reg  5. : Voiding  6. DVT ppx: SCDs, SQH 5000 mg BID  7. Dispo: POD #4
po
po

## 2019-06-12 DIAGNOSIS — O36.8330 MATERNAL CARE FOR ABNORMALITIES OF THE FETAL HEART RATE OR RHYTHM, THIRD TRIMESTER, NOT APPLICABLE OR UNSPECIFIED: ICD-10-CM
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
## 2019-06-12 DIAGNOSIS — O40.3XX0 POLYHYDRAMNIOS, THIRD TRIMESTER, NOT APPLICABLE OR UNSPECIFIED: ICD-10-CM
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
## 2019-06-12 DIAGNOSIS — Z3A.37 37 WEEKS GESTATION OF PREGNANCY: ICD-10-CM

## 2019-06-12 DIAGNOSIS — O44.03 COMPLETE PLACENTA PREVIA NOS OR WITHOUT HEMORRHAGE, THIRD TRIMESTER: ICD-10-CM

## 2019-06-12 DIAGNOSIS — O34.211 MATERNAL CARE FOR LOW TRANSVERSE SCAR FROM PREVIOUS CESAREAN DELIVERY: ICD-10-CM

## 2019-06-12 DIAGNOSIS — T88.59XA OTHER COMPLICATIONS OF ANESTHESIA, INITIAL ENCOUNTER: ICD-10-CM

## 2020-10-19 ENCOUNTER — TRANSCRIPTION ENCOUNTER (OUTPATIENT)
Age: 35
End: 2020-10-19

## 2020-10-20 ENCOUNTER — RESULT REVIEW (OUTPATIENT)
Age: 35
End: 2020-10-20

## 2020-10-20 ENCOUNTER — INPATIENT (INPATIENT)
Facility: HOSPITAL | Age: 35
LOS: 0 days | Discharge: HOME | DRG: 661 | End: 2020-10-20
Attending: UROLOGY | Admitting: UROLOGY
Payer: COMMERCIAL

## 2020-10-20 VITALS
HEART RATE: 71 BPM | RESPIRATION RATE: 14 BRPM | SYSTOLIC BLOOD PRESSURE: 125 MMHG | OXYGEN SATURATION: 99 % | DIASTOLIC BLOOD PRESSURE: 70 MMHG

## 2020-10-20 VITALS
DIASTOLIC BLOOD PRESSURE: 78 MMHG | RESPIRATION RATE: 18 BRPM | SYSTOLIC BLOOD PRESSURE: 119 MMHG | WEIGHT: 130.07 LBS | OXYGEN SATURATION: 98 % | TEMPERATURE: 98 F | HEART RATE: 79 BPM | HEIGHT: 63 IN

## 2020-10-20 LAB
ALBUMIN SERPL ELPH-MCNC: 4.5 G/DL — SIGNIFICANT CHANGE UP (ref 3.3–5)
ALP SERPL-CCNC: 64 U/L — SIGNIFICANT CHANGE UP (ref 40–120)
ALT FLD-CCNC: 20 U/L — SIGNIFICANT CHANGE UP (ref 10–45)
ANION GAP SERPL CALC-SCNC: 9 MMOL/L — SIGNIFICANT CHANGE UP (ref 5–17)
APPEARANCE UR: CLEAR — SIGNIFICANT CHANGE UP
APTT BLD: 29.5 SEC — SIGNIFICANT CHANGE UP (ref 27.5–35.5)
AST SERPL-CCNC: 19 U/L — SIGNIFICANT CHANGE UP (ref 10–40)
BACTERIA # UR AUTO: PRESENT /HPF
BASOPHILS # BLD AUTO: 0.03 K/UL — SIGNIFICANT CHANGE UP (ref 0–0.2)
BASOPHILS NFR BLD AUTO: 0.5 % — SIGNIFICANT CHANGE UP (ref 0–2)
BILIRUB SERPL-MCNC: 0.4 MG/DL — SIGNIFICANT CHANGE UP (ref 0.2–1.2)
BILIRUB UR-MCNC: NEGATIVE — SIGNIFICANT CHANGE UP
BUN SERPL-MCNC: 16 MG/DL — SIGNIFICANT CHANGE UP (ref 7–23)
CALCIUM SERPL-MCNC: 9 MG/DL — SIGNIFICANT CHANGE UP (ref 8.4–10.5)
CHLORIDE SERPL-SCNC: 102 MMOL/L — SIGNIFICANT CHANGE UP (ref 96–108)
CO2 SERPL-SCNC: 26 MMOL/L — SIGNIFICANT CHANGE UP (ref 22–31)
COLOR SPEC: YELLOW — SIGNIFICANT CHANGE UP
CREAT SERPL-MCNC: 1.01 MG/DL — SIGNIFICANT CHANGE UP (ref 0.5–1.3)
DIFF PNL FLD: ABNORMAL
EOSINOPHIL # BLD AUTO: 0.04 K/UL — SIGNIFICANT CHANGE UP (ref 0–0.5)
EOSINOPHIL NFR BLD AUTO: 0.6 % — SIGNIFICANT CHANGE UP (ref 0–6)
EPI CELLS # UR: ABNORMAL /HPF (ref 0–5)
GLUCOSE SERPL-MCNC: 107 MG/DL — HIGH (ref 70–99)
GLUCOSE UR QL: NEGATIVE — SIGNIFICANT CHANGE UP
HCT VFR BLD CALC: 37.7 % — SIGNIFICANT CHANGE UP (ref 34.5–45)
HGB BLD-MCNC: 12.2 G/DL — SIGNIFICANT CHANGE UP (ref 11.5–15.5)
IMM GRANULOCYTES NFR BLD AUTO: 0.3 % — SIGNIFICANT CHANGE UP (ref 0–1.5)
INR BLD: 0.92 — SIGNIFICANT CHANGE UP (ref 0.88–1.16)
KETONES UR-MCNC: NEGATIVE — SIGNIFICANT CHANGE UP
LEUKOCYTE ESTERASE UR-ACNC: NEGATIVE — SIGNIFICANT CHANGE UP
LYMPHOCYTES # BLD AUTO: 1.3 K/UL — SIGNIFICANT CHANGE UP (ref 1–3.3)
LYMPHOCYTES # BLD AUTO: 20.6 % — SIGNIFICANT CHANGE UP (ref 13–44)
MCHC RBC-ENTMCNC: 28.4 PG — SIGNIFICANT CHANGE UP (ref 27–34)
MCHC RBC-ENTMCNC: 32.4 GM/DL — SIGNIFICANT CHANGE UP (ref 32–36)
MCV RBC AUTO: 87.9 FL — SIGNIFICANT CHANGE UP (ref 80–100)
MONOCYTES # BLD AUTO: 0.57 K/UL — SIGNIFICANT CHANGE UP (ref 0–0.9)
MONOCYTES NFR BLD AUTO: 9 % — SIGNIFICANT CHANGE UP (ref 2–14)
NEUTROPHILS # BLD AUTO: 4.36 K/UL — SIGNIFICANT CHANGE UP (ref 1.8–7.4)
NEUTROPHILS NFR BLD AUTO: 69 % — SIGNIFICANT CHANGE UP (ref 43–77)
NITRITE UR-MCNC: NEGATIVE — SIGNIFICANT CHANGE UP
NRBC # BLD: 0 /100 WBCS — SIGNIFICANT CHANGE UP (ref 0–0)
PH UR: 6.5 — SIGNIFICANT CHANGE UP (ref 5–8)
PLATELET # BLD AUTO: 219 K/UL — SIGNIFICANT CHANGE UP (ref 150–400)
POTASSIUM SERPL-MCNC: 4.2 MMOL/L — SIGNIFICANT CHANGE UP (ref 3.5–5.3)
POTASSIUM SERPL-SCNC: 4.2 MMOL/L — SIGNIFICANT CHANGE UP (ref 3.5–5.3)
PROT SERPL-MCNC: 6.9 G/DL — SIGNIFICANT CHANGE UP (ref 6–8.3)
PROT UR-MCNC: NEGATIVE MG/DL — SIGNIFICANT CHANGE UP
PROTHROM AB SERPL-ACNC: 11.1 SEC — SIGNIFICANT CHANGE UP (ref 10.6–13.6)
RBC # BLD: 4.29 M/UL — SIGNIFICANT CHANGE UP (ref 3.8–5.2)
RBC # FLD: 13.2 % — SIGNIFICANT CHANGE UP (ref 10.3–14.5)
RBC CASTS # UR COMP ASSIST: < 5 /HPF — SIGNIFICANT CHANGE UP
SARS-COV-2 RNA SPEC QL NAA+PROBE: SIGNIFICANT CHANGE UP
SODIUM SERPL-SCNC: 137 MMOL/L — SIGNIFICANT CHANGE UP (ref 135–145)
SP GR SPEC: 1.02 — SIGNIFICANT CHANGE UP (ref 1–1.03)
UROBILINOGEN FLD QL: 0.2 E.U./DL — SIGNIFICANT CHANGE UP
WBC # BLD: 6.32 K/UL — SIGNIFICANT CHANGE UP (ref 3.8–10.5)
WBC # FLD AUTO: 6.32 K/UL — SIGNIFICANT CHANGE UP (ref 3.8–10.5)
WBC UR QL: ABNORMAL /HPF

## 2020-10-20 PROCEDURE — 86922 COMPATIBILITY TEST ANTIGLOB: CPT

## 2020-10-20 PROCEDURE — 86901 BLOOD TYPING SEROLOGIC RH(D): CPT

## 2020-10-20 PROCEDURE — 86921 COMPATIBILITY TEST INCUBATE: CPT

## 2020-10-20 PROCEDURE — 86780 TREPONEMA PALLIDUM: CPT

## 2020-10-20 PROCEDURE — 85025 COMPLETE CBC W/AUTO DIFF WBC: CPT

## 2020-10-20 PROCEDURE — 88307 TISSUE EXAM BY PATHOLOGIST: CPT

## 2020-10-20 PROCEDURE — 88300 SURGICAL PATH GROSS: CPT | Mod: 26

## 2020-10-20 PROCEDURE — 82962 GLUCOSE BLOOD TEST: CPT

## 2020-10-20 PROCEDURE — 86900 BLOOD TYPING SEROLOGIC ABO: CPT

## 2020-10-20 PROCEDURE — 86870 RBC ANTIBODY IDENTIFICATION: CPT

## 2020-10-20 PROCEDURE — 36415 COLL VENOUS BLD VENIPUNCTURE: CPT

## 2020-10-20 PROCEDURE — 86850 RBC ANTIBODY SCREEN: CPT

## 2020-10-20 PROCEDURE — 99285 EMERGENCY DEPT VISIT HI MDM: CPT

## 2020-10-20 PROCEDURE — 71046 X-RAY EXAM CHEST 2 VIEWS: CPT | Mod: 26

## 2020-10-20 RX ORDER — MOXIFLOXACIN HYDROCHLORIDE TABLETS, 400 MG 400 MG/1
1 TABLET, FILM COATED ORAL
Qty: 8 | Refills: 0
Start: 2020-10-20 | End: 2020-10-23

## 2020-10-20 RX ORDER — TAMSULOSIN HYDROCHLORIDE 0.4 MG/1
0.4 CAPSULE ORAL DAILY
Refills: 0 | Status: DISCONTINUED | OUTPATIENT
Start: 2020-10-20 | End: 2020-10-20

## 2020-10-20 RX ORDER — SODIUM CHLORIDE 9 MG/ML
1000 INJECTION INTRAMUSCULAR; INTRAVENOUS; SUBCUTANEOUS ONCE
Refills: 0 | Status: COMPLETED | OUTPATIENT
Start: 2020-10-20 | End: 2020-10-20

## 2020-10-20 RX ORDER — ONDANSETRON 8 MG/1
4 TABLET, FILM COATED ORAL EVERY 6 HOURS
Refills: 0 | Status: DISCONTINUED | OUTPATIENT
Start: 2020-10-20 | End: 2020-10-20

## 2020-10-20 RX ORDER — DIAZEPAM 5 MG
1 TABLET ORAL
Qty: 10 | Refills: 0
Start: 2020-10-20

## 2020-10-20 RX ORDER — INFLUENZA VIRUS VACCINE 15; 15; 15; 15 UG/.5ML; UG/.5ML; UG/.5ML; UG/.5ML
0.5 SUSPENSION INTRAMUSCULAR ONCE
Refills: 0 | Status: DISCONTINUED | OUTPATIENT
Start: 2020-10-20 | End: 2020-10-20

## 2020-10-20 RX ORDER — ONDANSETRON 8 MG/1
4 TABLET, FILM COATED ORAL ONCE
Refills: 0 | Status: COMPLETED | OUTPATIENT
Start: 2020-10-20 | End: 2020-10-20

## 2020-10-20 RX ORDER — MORPHINE SULFATE 50 MG/1
4 CAPSULE, EXTENDED RELEASE ORAL ONCE
Refills: 0 | Status: DISCONTINUED | OUTPATIENT
Start: 2020-10-20 | End: 2020-10-20

## 2020-10-20 RX ORDER — HYDROMORPHONE HYDROCHLORIDE 2 MG/ML
0.5 INJECTION INTRAMUSCULAR; INTRAVENOUS; SUBCUTANEOUS
Refills: 0 | Status: DISCONTINUED | OUTPATIENT
Start: 2020-10-20 | End: 2020-10-20

## 2020-10-20 RX ORDER — SODIUM CHLORIDE 9 MG/ML
1000 INJECTION INTRAMUSCULAR; INTRAVENOUS; SUBCUTANEOUS
Refills: 0 | Status: DISCONTINUED | OUTPATIENT
Start: 2020-10-20 | End: 2020-10-20

## 2020-10-20 RX ADMIN — MORPHINE SULFATE 4 MILLIGRAM(S): 50 CAPSULE, EXTENDED RELEASE ORAL at 11:29

## 2020-10-20 RX ADMIN — SODIUM CHLORIDE 1000 MILLILITER(S): 9 INJECTION INTRAMUSCULAR; INTRAVENOUS; SUBCUTANEOUS at 11:29

## 2020-10-20 RX ADMIN — MORPHINE SULFATE 4 MILLIGRAM(S): 50 CAPSULE, EXTENDED RELEASE ORAL at 11:48

## 2020-10-20 RX ADMIN — TAMSULOSIN HYDROCHLORIDE 0.4 MILLIGRAM(S): 0.4 CAPSULE ORAL at 12:50

## 2020-10-20 RX ADMIN — HYDROMORPHONE HYDROCHLORIDE 0.5 MILLIGRAM(S): 2 INJECTION INTRAMUSCULAR; INTRAVENOUS; SUBCUTANEOUS at 13:45

## 2020-10-20 RX ADMIN — SODIUM CHLORIDE 125 MILLILITER(S): 9 INJECTION INTRAMUSCULAR; INTRAVENOUS; SUBCUTANEOUS at 12:51

## 2020-10-20 RX ADMIN — ONDANSETRON 4 MILLIGRAM(S): 8 TABLET, FILM COATED ORAL at 11:29

## 2020-10-20 NOTE — BRIEF OPERATIVE NOTE - NSICDXBRIEFPREOP_GEN_ALL_CORE_FT
PRE-OP DIAGNOSIS:  Right ureteral stone 20-Oct-2020 15:36:43  Roseann Gayle  Ureteral obstruction, right 20-Oct-2020 15:35:46  Roseann Gayle

## 2020-10-20 NOTE — H&P ADULT - ASSESSMENT
34 yo f with right flank pain secondary to distal 8 mm right ureteral stone, pain not controlled with oral analgesics  1)admit to uro  2) npo and preop for OR today for stent  3) pain control  4) discussed with gu attending

## 2020-10-20 NOTE — ED PROVIDER NOTE - CLINICAL SUMMARY MEDICAL DECISION MAKING FREE TEXT BOX
36 y/o F was recently diagnosed with R distal kidney stone x2 days c/o R flank pain a/w chills, N/V. No fever. Vital signs stable, pt well appearing. Abd soft non tender. + R CVAT. lithotripsy with Dr. Engel.

## 2020-10-20 NOTE — BRIEF OPERATIVE NOTE - NSICDXBRIEFPOSTOP_GEN_ALL_CORE_FT
POST-OP DIAGNOSIS:  Right ureteral stone 20-Oct-2020 15:39:38  Roseann Gayle  Ureteral obstruction, right 20-Oct-2020 15:37:20  Roseann Gayle

## 2020-10-20 NOTE — ED ADULT NURSE NOTE - OBJECTIVE STATEMENT
Pt reports RLQ to right back pain starting on Sunday, was seen in ED in [place] and reports 3 kidney stones. Pt was sent home with toradol and zofran and percocet and reports pain is continuing. Pt sent in for surgery. Pt denies chills, fever, also reports "some" burning with urination starting yesterday. Pt reports vomiting yesterday and nausea.

## 2020-10-20 NOTE — ED PROVIDER NOTE - OBJECTIVE STATEMENT
36 y/o F recently diagnosed with Kidney stone at [facility] presents to the ED c/o R flank pain x2 days radiating to abd a/w chills, N/V. No fever, hematuria, dysuria, or urinary frequency. Pt takes percocet and ketorolac for her pain. 36 y/o F recently diagnosed with Kidney stone at [facility] presents to the ED c/o R flank pain x2 days radiating to abd a/w chills, N/V. No fever, hematuria, dysuria, or urinary frequency. Pt takes percocet and ketorolac for her pain. Pt saw Dr. Good yesterday who told her she would need a procedure because the stone is 8mm. Pt was having more pain today so was advised to come to the ED.

## 2020-10-20 NOTE — ED ADULT NURSE NOTE - NSIMPLEMENTINTERV_GEN_ALL_ED
Implemented All Universal Safety Interventions:  [place] to call system. Call bell, personal items and telephone within reach. Instruct patient to call for assistance. Room bathroom lighting operational. Non-slip footwear when patient is off stretcher. Physically safe environment: no spills, clutter or unnecessary equipment. Stretcher in lowest position, wheels locked, appropriate side rails in place.

## 2020-10-20 NOTE — ED PROVIDER NOTE - NS ED ROS FT
Constitutional: no fever, chills    All other ROS neg except as per HPI Constitutional: no fever, +chills    All other ROS neg except as per HPI

## 2020-10-20 NOTE — H&P ADULT - HISTORY OF PRESENT ILLNESS
36 yo f with hx of right flank pain which radiates to rlq and groin for 4 days. Patient states had a ct done at urgent care hospital which showed 8 mm distal right ureteral stone. She saw Dr min yesterday, at that time her pain was controlled. Overnight her pain became more severe and was associated with nausea and vomiting. She c/o some pressure with urination, but states she is emptying her bladder, denies dysuria, hematuria or frequency. Denies fevers but c/o some chills two days ago.

## 2020-10-20 NOTE — PACU DISCHARGE NOTE - COMMENTS
cleared by SHIRLEY, discharged instructions provided, discharged via ambulatory to the lobby escorted home by .

## 2020-10-20 NOTE — ED PROVIDER NOTE - TEMPLATE
Nutrition Assessment  Reason For Visit:  Dara Anderson is a 56 year old female presenting today for nutrition follow-up, 1 week s/p SG with Dr Puente on 9/11/18.  Patient referred by Dr Puente(9/20/18).    Anthropometrics  Initial Consult Weight: 255.3 lbs  Day of Surgery Weight: 212.5 lbs  Current Weight: 202.2 lbs  Weight loss: -53.1 lbs from initial consult; -10.3 lbs from day of surgery    Current Vitamins/Minerals: none    Nutrition History  Pt reports consuming and tolerating bariatric clear and low-fat full liquid diets. Fluid intake appears adequate, consuming 48-64 oz/day.    Nutrition Prescription:  Grams Protein: 60 (minimum)  Amount of Fluid: 48-64 oz    Nutrition Diagnosis  Food and nutrition-related knowledge deficit r/t lack of prior exposure to diet advancements beyond bariatric low-fat full liquid diet aeb pt unable to verbalize understanding of bariatric pureed and soft diets.    Intervention  Intervention At Appointment:  Materials/education provided on bariatric pureed and soft diets, protein intake, fluid intake, eating pace, portion control, avoiding excess sugar and fat, recommended vitamin/mineral supplements.    Patient Understanding: good  Expected Compliance: good    Goals:  1) Follow bariatric low-fat full liquid diet through day 13 post-op, then to progress to pureed diet x 2 weeks(9/25).  If tolerating, may advance on day 29 post-op to bariatric soft diet(10/9).   2) Work towards 60 gm protein/day.  3) Consume 48-64 oz fluids daily- between meals.  4) Eat slowly (>20 min/meal), chewing well to smooth consistency once on the bariatric soft diet.  5) Limit portions to 1/2 cup/meal.  6) Start chewable/liquid multivitamin/minerals twice daily.    Follow-Up: 3 weeks or prn    Time spent with patient: 15 minutes.  Vicki Crawford, RD, LD    
Back Pain
Graft Donor Site Bandage (Optional-Leave Blank If You Don't Want In Note): Steri-strips and a pressure bandage were applied to the donor site.

## 2020-10-20 NOTE — H&P ADULT - ATTENDING COMMENTS
Agree with above note and plan.  Patient with 3 right distal ureteral stones, the largest measuring 8 mm in diameter.  We will proceed with ureteral stent and possible ureteroscopy with laser lithotripsy.

## 2020-10-20 NOTE — ED PROVIDER NOTE - PHYSICAL EXAMINATION
CONSTITUTIONAL: Well-appearing; well-nourished; in no apparent distress.   HEAD: Normocephalic; atraumatic.   EYES: PERRL; EOM intact; conjunctiva and sclera clear  ENT: normal nose; no rhinorrhea; normal pharynx with no erythema or lesions.   NECK: Supple; non-tender; no LAD  CARDIOVASCULAR: Normal S1, S2; no murmurs, rubs, or gallops. Regular rate and rhythm.   RESPIRATORY: Breathing easily; breath sounds clear and equal bilaterally; no wheezes, rhonchi, or rales.  GI: Soft; non-distended; non-tender; no palpable organomegaly.   MSK: FROM at all extremities, normal tone   EXT: No cyanosis or edema; N/V intact. + R CVAT.   SKIN: Normal for age and race; warm; dry; good turgor; no apparent lesions or rash.   NEURO: A & O x 3; face symmetric; grossly unremarkable.   PSYCHOLOGICAL: The patient’s mood and manner are appropriate.

## 2020-10-20 NOTE — BRIEF OPERATIVE NOTE - OPERATION/FINDINGS
Patient prepped and draped in sterile fashion. Cystoscopy performed and bilateral ureter orifices identified. Right ureteroscopy performed and one small stone removed using basket. Laser lithotripsy performed and remainder of stones extracted using basket. Right ureteral stent placed and hemostatsis confirmed.

## 2020-10-20 NOTE — ED ADULT TRIAGE NOTE - OTHER COMPLAINTS
reports that she in another ED and they told her that she has a stone. Was sent by her doctor. reports that she was in another ED and they told her that she has a stone. Was sent by her doctor. States that the medications they gave her are not helping. Took toradol before coming in.

## 2020-10-20 NOTE — BRIEF OPERATIVE NOTE - NSICDXBRIEFPROCEDURE_GEN_ALL_CORE_FT
PROCEDURES:  Cystoscopy, with ureteroscopy, laser lithotripsy, and ureteral stent insertion 20-Oct-2020 15:34:49  Roseann Gayle

## 2020-10-20 NOTE — ED ADULT NURSE NOTE - OTHER COMPLAINTS
reports that she was in another ED and they told her that she has a stone. Was sent by her doctor. States that the medications they gave her are not helping. Took toradol before coming in.

## 2020-10-20 NOTE — ED PROVIDER NOTE - ATTENDING CONTRIBUTION TO CARE
36 yo F p/w R flank pain x 2 days with n/v and chills.  No fever.  Taking pain meds but still in pain.  8mm stone to R ureter known.  PT HD stable, looks well, nad, + CVAT.  Belly soft.  Plan labs, urine, ivf, consult urology.

## 2020-10-21 LAB
CULTURE RESULTS: NO GROWTH — SIGNIFICANT CHANGE UP
SPECIMEN SOURCE: SIGNIFICANT CHANGE UP

## 2020-10-21 PROCEDURE — 76000 FLUOROSCOPY <1 HR PHYS/QHP: CPT

## 2020-10-21 PROCEDURE — C1889: CPT

## 2020-10-21 PROCEDURE — 87635 SARS-COV-2 COVID-19 AMP PRB: CPT

## 2020-10-21 PROCEDURE — 88300 SURGICAL PATH GROSS: CPT

## 2020-10-21 PROCEDURE — 96374 THER/PROPH/DIAG INJ IV PUSH: CPT

## 2020-10-21 PROCEDURE — 36415 COLL VENOUS BLD VENIPUNCTURE: CPT

## 2020-10-21 PROCEDURE — 99285 EMERGENCY DEPT VISIT HI MDM: CPT | Mod: 25

## 2020-10-21 PROCEDURE — C1758: CPT

## 2020-10-21 PROCEDURE — 87086 URINE CULTURE/COLONY COUNT: CPT

## 2020-10-21 PROCEDURE — 80053 COMPREHEN METABOLIC PANEL: CPT

## 2020-10-21 PROCEDURE — 85025 COMPLETE CBC W/AUTO DIFF WBC: CPT

## 2020-10-21 PROCEDURE — 85610 PROTHROMBIN TIME: CPT

## 2020-10-21 PROCEDURE — 81001 URINALYSIS AUTO W/SCOPE: CPT

## 2020-10-21 PROCEDURE — 85730 THROMBOPLASTIN TIME PARTIAL: CPT

## 2020-10-21 PROCEDURE — C1769: CPT

## 2020-10-21 PROCEDURE — 96375 TX/PRO/DX INJ NEW DRUG ADDON: CPT

## 2020-10-21 PROCEDURE — 71046 X-RAY EXAM CHEST 2 VIEWS: CPT

## 2020-10-21 PROCEDURE — 82365 CALCULUS SPECTROSCOPY: CPT

## 2020-10-21 PROCEDURE — C2617: CPT

## 2020-10-22 DIAGNOSIS — N13.2 HYDRONEPHROSIS WITH RENAL AND URETERAL CALCULOUS OBSTRUCTION: ICD-10-CM

## 2020-10-22 DIAGNOSIS — N20.0 CALCULUS OF KIDNEY: ICD-10-CM

## 2020-10-23 ENCOUNTER — APPOINTMENT (OUTPATIENT)
Dept: INTERNAL MEDICINE | Facility: CLINIC | Age: 35
End: 2020-10-23
Payer: COMMERCIAL

## 2020-10-23 VITALS
TEMPERATURE: 98.4 F | DIASTOLIC BLOOD PRESSURE: 78 MMHG | OXYGEN SATURATION: 97 % | WEIGHT: 130 LBS | SYSTOLIC BLOOD PRESSURE: 112 MMHG | BODY MASS INDEX: 23.04 KG/M2 | HEIGHT: 63 IN | HEART RATE: 74 BPM

## 2020-10-23 DIAGNOSIS — Z80.8 FAMILY HISTORY OF MALIGNANT NEOPLASM OF OTHER ORGANS OR SYSTEMS: ICD-10-CM

## 2020-10-23 DIAGNOSIS — Z80.3 FAMILY HISTORY OF MALIGNANT NEOPLASM OF BREAST: ICD-10-CM

## 2020-10-23 DIAGNOSIS — Z00.00 ENCOUNTER FOR GENERAL ADULT MEDICAL EXAMINATION W/OUT ABNORMAL FINDINGS: ICD-10-CM

## 2020-10-23 DIAGNOSIS — N20.0 CALCULUS OF KIDNEY: ICD-10-CM

## 2020-10-23 DIAGNOSIS — Z78.9 OTHER SPECIFIED HEALTH STATUS: ICD-10-CM

## 2020-10-23 DIAGNOSIS — R16.0 HEPATOMEGALY, NOT ELSEWHERE CLASSIFIED: ICD-10-CM

## 2020-10-23 LAB — NIDUS STONE QN: SIGNIFICANT CHANGE UP

## 2020-10-23 PROCEDURE — 36415 COLL VENOUS BLD VENIPUNCTURE: CPT

## 2020-10-23 PROCEDURE — 99204 OFFICE O/P NEW MOD 45 MIN: CPT

## 2020-10-23 PROCEDURE — 99072 ADDL SUPL MATRL&STAF TM PHE: CPT

## 2020-10-24 NOTE — PLAN
[FreeTextEntry1] : Plan of care: \par 1) Liver mass - eep appt with hepatologist at [facility] for this.  Reviewed films - may be reachable via EUS. or MRI as an option.  Labs to be sent today including ggt and afp\par \par 2) Kidney stones - increase fluids.  Await stone pathology, f/up gu\par \par

## 2020-10-24 NOTE — HISTORY OF PRESENT ILLNESS
[FreeTextEntry1] : kidney stone and liver mass [de-identified] : \par Last Sunday with abd pain- went to ED in [facility].  \par CT - incidental liver mass.  3 kidney stones. \par Saw  this tuesday, that envening in ed- kideny stones extracted.- Has a stent in p[lace with removal for next week. \par Still feling tired and with nausea but pain has improved.  Has some burning with urination.\par Appt pending for next week - Rico clemons at [facility] and Amauri Joy. both hepatlogist\par \par Takes herbal supplements, vitamins. \par given cipro, valium and flomax

## 2020-10-25 LAB
AFP-TM SERPL-MCNC: 2.5 NG/ML
ALBUMIN SERPL ELPH-MCNC: 4.6 G/DL
ALP BLD-CCNC: 68 U/L
ALT SERPL-CCNC: 15 U/L
ANION GAP SERPL CALC-SCNC: 14 MMOL/L
AST SERPL-CCNC: 12 U/L
BASOPHILS # BLD AUTO: 0.06 K/UL
BASOPHILS NFR BLD AUTO: 0.9 %
BILIRUB SERPL-MCNC: 0.3 MG/DL
BUN SERPL-MCNC: 18 MG/DL
CALCIUM SERPL-MCNC: 10 MG/DL
CHLORIDE SERPL-SCNC: 100 MMOL/L
CHOLEST SERPL-MCNC: 218 MG/DL
CO2 SERPL-SCNC: 26 MMOL/L
CREAT SERPL-MCNC: 0.84 MG/DL
EOSINOPHIL # BLD AUTO: 0.1 K/UL
EOSINOPHIL NFR BLD AUTO: 1.4 %
ERYTHROCYTE [SEDIMENTATION RATE] IN BLOOD BY WESTERGREN METHOD: 3 MM/HR
ESTIMATED AVERAGE GLUCOSE: 103 MG/DL
GGT SERPL-CCNC: 16 U/L
GLUCOSE SERPL-MCNC: 91 MG/DL
HBA1C MFR BLD HPLC: 5.2 %
HBV SURFACE AB SER QL: NONREACTIVE
HBV SURFACE AG SER QL: NONREACTIVE
HCT VFR BLD CALC: 42.4 %
HCV AB SER QL: NONREACTIVE
HCV S/CO RATIO: 0.14 S/CO
HDLC SERPL-MCNC: 65 MG/DL
HGB BLD-MCNC: 13.1 G/DL
IMM GRANULOCYTES NFR BLD AUTO: 0.4 %
LDLC SERPL CALC-MCNC: 129 MG/DL
LYMPHOCYTES # BLD AUTO: 1.99 K/UL
LYMPHOCYTES NFR BLD AUTO: 28.3 %
MAN DIFF?: NORMAL
MCHC RBC-ENTMCNC: 28.4 PG
MCHC RBC-ENTMCNC: 30.9 GM/DL
MCV RBC AUTO: 92 FL
MONOCYTES # BLD AUTO: 0.47 K/UL
MONOCYTES NFR BLD AUTO: 6.7 %
NEUTROPHILS # BLD AUTO: 4.38 K/UL
NEUTROPHILS NFR BLD AUTO: 62.3 %
NONHDLC SERPL-MCNC: 154 MG/DL
PLATELET # BLD AUTO: 268 K/UL
POTASSIUM SERPL-SCNC: 4.4 MMOL/L
PROT SERPL-MCNC: 7.2 G/DL
RBC # BLD: 4.61 M/UL
RBC # FLD: 13.4 %
SODIUM SERPL-SCNC: 140 MMOL/L
TRIGL SERPL-MCNC: 123 MG/DL
WBC # FLD AUTO: 7.03 K/UL

## 2020-10-26 LAB — SURGICAL PATHOLOGY STUDY: SIGNIFICANT CHANGE UP

## 2020-11-20 ENCOUNTER — OUTPATIENT (OUTPATIENT)
Dept: OUTPATIENT SERVICES | Facility: HOSPITAL | Age: 35
LOS: 1 days | End: 2020-11-20
Payer: COMMERCIAL

## 2020-11-20 ENCOUNTER — APPOINTMENT (OUTPATIENT)
Dept: MRI IMAGING | Facility: HOSPITAL | Age: 35
End: 2020-11-20
Payer: COMMERCIAL

## 2020-11-20 PROCEDURE — A9585: CPT

## 2020-11-20 PROCEDURE — 74183 MRI ABD W/O CNTR FLWD CNTR: CPT | Mod: 26

## 2020-11-20 PROCEDURE — 74183 MRI ABD W/O CNTR FLWD CNTR: CPT

## 2021-04-20 ENCOUNTER — APPOINTMENT (OUTPATIENT)
Dept: ANTEPARTUM | Facility: CLINIC | Age: 36
End: 2021-04-20
Payer: COMMERCIAL

## 2021-04-20 ENCOUNTER — ASOB RESULT (OUTPATIENT)
Age: 36
End: 2021-04-20

## 2021-04-20 PROCEDURE — 76801 OB US < 14 WKS SINGLE FETUS: CPT

## 2021-04-20 PROCEDURE — 99072 ADDL SUPL MATRL&STAF TM PHE: CPT

## 2021-04-20 PROCEDURE — 76813 OB US NUCHAL MEAS 1 GEST: CPT

## 2021-05-24 ENCOUNTER — APPOINTMENT (OUTPATIENT)
Dept: ANTEPARTUM | Facility: CLINIC | Age: 36
End: 2021-05-24

## 2021-06-28 ENCOUNTER — APPOINTMENT (OUTPATIENT)
Dept: ANTEPARTUM | Facility: CLINIC | Age: 36
End: 2021-06-28

## 2021-07-14 ENCOUNTER — APPOINTMENT (OUTPATIENT)
Dept: ANTEPARTUM | Facility: CLINIC | Age: 36
End: 2021-07-14
Payer: COMMERCIAL

## 2021-07-14 ENCOUNTER — ASOB RESULT (OUTPATIENT)
Age: 36
End: 2021-07-14

## 2021-07-14 PROCEDURE — 76817 TRANSVAGINAL US OBSTETRIC: CPT | Mod: 59

## 2021-07-14 PROCEDURE — 76811 OB US DETAILED SNGL FETUS: CPT

## 2021-07-14 PROCEDURE — 99072 ADDL SUPL MATRL&STAF TM PHE: CPT

## 2021-08-11 ENCOUNTER — APPOINTMENT (OUTPATIENT)
Dept: ANTEPARTUM | Facility: CLINIC | Age: 36
End: 2021-08-11
Payer: COMMERCIAL

## 2021-08-11 ENCOUNTER — ASOB RESULT (OUTPATIENT)
Age: 36
End: 2021-08-11

## 2021-08-11 PROCEDURE — 76816 OB US FOLLOW-UP PER FETUS: CPT

## 2021-08-11 PROCEDURE — 76819 FETAL BIOPHYS PROFIL W/O NST: CPT

## 2021-09-07 ENCOUNTER — ASOB RESULT (OUTPATIENT)
Age: 36
End: 2021-09-07

## 2021-09-07 ENCOUNTER — APPOINTMENT (OUTPATIENT)
Dept: ANTEPARTUM | Facility: CLINIC | Age: 36
End: 2021-09-07
Payer: COMMERCIAL

## 2021-09-07 PROCEDURE — 76816 OB US FOLLOW-UP PER FETUS: CPT

## 2021-09-07 PROCEDURE — 76819 FETAL BIOPHYS PROFIL W/O NST: CPT

## 2021-10-05 ENCOUNTER — APPOINTMENT (OUTPATIENT)
Dept: ANTEPARTUM | Facility: CLINIC | Age: 36
End: 2021-10-05
Payer: COMMERCIAL

## 2021-10-05 ENCOUNTER — ASOB RESULT (OUTPATIENT)
Age: 36
End: 2021-10-05

## 2021-10-05 PROCEDURE — 76816 OB US FOLLOW-UP PER FETUS: CPT

## 2021-10-05 PROCEDURE — 76819 FETAL BIOPHYS PROFIL W/O NST: CPT

## 2021-10-12 ENCOUNTER — APPOINTMENT (OUTPATIENT)
Dept: ANTEPARTUM | Facility: CLINIC | Age: 36
End: 2021-10-12

## 2021-10-19 ENCOUNTER — ASOB RESULT (OUTPATIENT)
Age: 36
End: 2021-10-19

## 2021-10-19 ENCOUNTER — APPOINTMENT (OUTPATIENT)
Dept: ANTEPARTUM | Facility: CLINIC | Age: 36
End: 2021-10-19
Payer: COMMERCIAL

## 2021-10-19 PROCEDURE — 76816 OB US FOLLOW-UP PER FETUS: CPT

## 2021-10-19 PROCEDURE — 76819 FETAL BIOPHYS PROFIL W/O NST: CPT

## 2021-10-26 ENCOUNTER — ASOB RESULT (OUTPATIENT)
Age: 36
End: 2021-10-26

## 2021-10-26 ENCOUNTER — OUTPATIENT (OUTPATIENT)
Dept: OUTPATIENT SERVICES | Facility: HOSPITAL | Age: 36
LOS: 1 days | End: 2021-10-26
Payer: COMMERCIAL

## 2021-10-26 ENCOUNTER — APPOINTMENT (OUTPATIENT)
Dept: ANTEPARTUM | Facility: CLINIC | Age: 36
End: 2021-10-26
Payer: COMMERCIAL

## 2021-10-26 DIAGNOSIS — Z01.818 ENCOUNTER FOR OTHER PREPROCEDURAL EXAMINATION: ICD-10-CM

## 2021-10-26 LAB
BLD GP AB SCN SERPL QL: POSITIVE — SIGNIFICANT CHANGE UP
BLD GP AB SCN SERPL QL: POSITIVE — SIGNIFICANT CHANGE UP
HCT VFR BLD CALC: 39.7 % — SIGNIFICANT CHANGE UP (ref 34.5–45)
HGB BLD-MCNC: 13.5 G/DL — SIGNIFICANT CHANGE UP (ref 11.5–15.5)
MCHC RBC-ENTMCNC: 31.7 PG — SIGNIFICANT CHANGE UP (ref 27–34)
MCHC RBC-ENTMCNC: 34 GM/DL — SIGNIFICANT CHANGE UP (ref 32–36)
MCV RBC AUTO: 93.2 FL — SIGNIFICANT CHANGE UP (ref 80–100)
NRBC # BLD: 0 /100 WBCS — SIGNIFICANT CHANGE UP (ref 0–0)
PLATELET # BLD AUTO: 231 K/UL — SIGNIFICANT CHANGE UP (ref 150–400)
RBC # BLD: 4.26 M/UL — SIGNIFICANT CHANGE UP (ref 3.8–5.2)
RBC # FLD: 13.9 % — SIGNIFICANT CHANGE UP (ref 10.3–14.5)
RH IG SCN BLD-IMP: NEGATIVE — SIGNIFICANT CHANGE UP
RH IG SCN BLD-IMP: NEGATIVE — SIGNIFICANT CHANGE UP
WBC # BLD: 7.99 K/UL — SIGNIFICANT CHANGE UP (ref 3.8–10.5)
WBC # FLD AUTO: 7.99 K/UL — SIGNIFICANT CHANGE UP (ref 3.8–10.5)

## 2021-10-26 PROCEDURE — 85027 COMPLETE CBC AUTOMATED: CPT

## 2021-10-26 PROCEDURE — 86780 TREPONEMA PALLIDUM: CPT

## 2021-10-26 PROCEDURE — 86077 PHYS BLOOD BANK SERV XMATCH: CPT

## 2021-10-26 PROCEDURE — 86850 RBC ANTIBODY SCREEN: CPT

## 2021-10-26 PROCEDURE — 76819 FETAL BIOPHYS PROFIL W/O NST: CPT

## 2021-10-26 PROCEDURE — 86900 BLOOD TYPING SEROLOGIC ABO: CPT

## 2021-10-26 PROCEDURE — 86880 COOMBS TEST DIRECT: CPT

## 2021-10-26 PROCEDURE — 86901 BLOOD TYPING SEROLOGIC RH(D): CPT

## 2021-10-27 ENCOUNTER — TRANSCRIPTION ENCOUNTER (OUTPATIENT)
Age: 36
End: 2021-10-27

## 2021-10-27 LAB — T PALLIDUM AB TITR SER: NEGATIVE — SIGNIFICANT CHANGE UP

## 2021-10-28 ENCOUNTER — TRANSCRIPTION ENCOUNTER (OUTPATIENT)
Age: 36
End: 2021-10-28

## 2021-10-28 ENCOUNTER — INPATIENT (INPATIENT)
Facility: HOSPITAL | Age: 36
LOS: 1 days | Discharge: HOME | End: 2021-10-30
Attending: OBSTETRICS & GYNECOLOGY | Admitting: OBSTETRICS & GYNECOLOGY
Payer: COMMERCIAL

## 2021-10-28 ENCOUNTER — RESULT REVIEW (OUTPATIENT)
Age: 36
End: 2021-10-28

## 2021-10-28 VITALS — HEIGHT: 63 IN | WEIGHT: 149.91 LBS

## 2021-10-28 LAB
BASOPHILS # BLD AUTO: 0.05 K/UL — SIGNIFICANT CHANGE UP (ref 0–0.2)
BASOPHILS NFR BLD AUTO: 0.7 % — SIGNIFICANT CHANGE UP (ref 0–2)
BLD GP AB SCN SERPL QL: POSITIVE — SIGNIFICANT CHANGE UP
COVID-19 SPIKE DOMAIN AB INTERP: POSITIVE
COVID-19 SPIKE DOMAIN ANTIBODY RESULT: >250 U/ML — HIGH
EOSINOPHIL # BLD AUTO: 0.09 K/UL — SIGNIFICANT CHANGE UP (ref 0–0.5)
EOSINOPHIL NFR BLD AUTO: 1.2 % — SIGNIFICANT CHANGE UP (ref 0–6)
HCT VFR BLD CALC: 39.5 % — SIGNIFICANT CHANGE UP (ref 34.5–45)
HGB BLD-MCNC: 13.4 G/DL — SIGNIFICANT CHANGE UP (ref 11.5–15.5)
IMM GRANULOCYTES NFR BLD AUTO: 1.2 % — SIGNIFICANT CHANGE UP (ref 0–1.5)
KLEIHAUER-BETKE CALCULATION: 0 % — SIGNIFICANT CHANGE UP (ref 0–0.3)
LYMPHOCYTES # BLD AUTO: 2.05 K/UL — SIGNIFICANT CHANGE UP (ref 1–3.3)
LYMPHOCYTES # BLD AUTO: 27.1 % — SIGNIFICANT CHANGE UP (ref 13–44)
MCHC RBC-ENTMCNC: 31.2 PG — SIGNIFICANT CHANGE UP (ref 27–34)
MCHC RBC-ENTMCNC: 33.9 GM/DL — SIGNIFICANT CHANGE UP (ref 32–36)
MCV RBC AUTO: 92.1 FL — SIGNIFICANT CHANGE UP (ref 80–100)
MONOCYTES # BLD AUTO: 1.07 K/UL — HIGH (ref 0–0.9)
MONOCYTES NFR BLD AUTO: 14.2 % — HIGH (ref 2–14)
NEUTROPHILS # BLD AUTO: 4.21 K/UL — SIGNIFICANT CHANGE UP (ref 1.8–7.4)
NEUTROPHILS NFR BLD AUTO: 55.6 % — SIGNIFICANT CHANGE UP (ref 43–77)
NRBC # BLD: 0 /100 WBCS — SIGNIFICANT CHANGE UP (ref 0–0)
PLATELET # BLD AUTO: 234 K/UL — SIGNIFICANT CHANGE UP (ref 150–400)
RBC # BLD: 4.29 M/UL — SIGNIFICANT CHANGE UP (ref 3.8–5.2)
RBC # FLD: 13.8 % — SIGNIFICANT CHANGE UP (ref 10.3–14.5)
RH IG SCN BLD-IMP: NEGATIVE — SIGNIFICANT CHANGE UP
SARS-COV-2 IGG+IGM SERPL QL IA: >250 U/ML — HIGH
SARS-COV-2 IGG+IGM SERPL QL IA: POSITIVE
T PALLIDUM AB TITR SER: NEGATIVE — SIGNIFICANT CHANGE UP
WBC # BLD: 7.56 K/UL — SIGNIFICANT CHANGE UP (ref 3.8–10.5)
WBC # FLD AUTO: 7.56 K/UL — SIGNIFICANT CHANGE UP (ref 3.8–10.5)

## 2021-10-28 PROCEDURE — 88307 TISSUE EXAM BY PATHOLOGIST: CPT | Mod: 26

## 2021-10-28 PROCEDURE — 59514 CESAREAN DELIVERY ONLY: CPT | Mod: AS

## 2021-10-28 RX ORDER — ONDANSETRON 8 MG/1
4 TABLET, FILM COATED ORAL EVERY 6 HOURS
Refills: 0 | Status: DISCONTINUED | OUTPATIENT
Start: 2021-10-28 | End: 2021-10-30

## 2021-10-28 RX ORDER — MAGNESIUM HYDROXIDE 400 MG/1
30 TABLET, CHEWABLE ORAL
Refills: 0 | Status: DISCONTINUED | OUTPATIENT
Start: 2021-10-28 | End: 2021-10-30

## 2021-10-28 RX ORDER — DIPHENHYDRAMINE HCL 50 MG
25 CAPSULE ORAL EVERY 6 HOURS
Refills: 0 | Status: DISCONTINUED | OUTPATIENT
Start: 2021-10-28 | End: 2021-10-30

## 2021-10-28 RX ORDER — OXYCODONE HYDROCHLORIDE 5 MG/1
5 TABLET ORAL
Refills: 0 | Status: DISCONTINUED | OUTPATIENT
Start: 2021-10-28 | End: 2021-10-30

## 2021-10-28 RX ORDER — NALOXONE HYDROCHLORIDE 4 MG/.1ML
0.1 SPRAY NASAL
Refills: 0 | Status: DISCONTINUED | OUTPATIENT
Start: 2021-10-28 | End: 2021-10-30

## 2021-10-28 RX ORDER — LANOLIN
1 OINTMENT (GRAM) TOPICAL EVERY 6 HOURS
Refills: 0 | Status: DISCONTINUED | OUTPATIENT
Start: 2021-10-28 | End: 2021-10-30

## 2021-10-28 RX ORDER — DEXAMETHASONE 0.5 MG/5ML
4 ELIXIR ORAL EVERY 6 HOURS
Refills: 0 | Status: DISCONTINUED | OUTPATIENT
Start: 2021-10-28 | End: 2021-10-30

## 2021-10-28 RX ORDER — KETOROLAC TROMETHAMINE 30 MG/ML
30 SYRINGE (ML) INJECTION EVERY 6 HOURS
Refills: 0 | Status: DISCONTINUED | OUTPATIENT
Start: 2021-10-28 | End: 2021-10-30

## 2021-10-28 RX ORDER — ACETAMINOPHEN 500 MG
975 TABLET ORAL
Refills: 0 | Status: DISCONTINUED | OUTPATIENT
Start: 2021-10-28 | End: 2021-10-30

## 2021-10-28 RX ORDER — SODIUM CHLORIDE 9 MG/ML
1000 INJECTION, SOLUTION INTRAVENOUS
Refills: 0 | Status: DISCONTINUED | OUTPATIENT
Start: 2021-10-28 | End: 2021-10-28

## 2021-10-28 RX ORDER — SODIUM CHLORIDE 9 MG/ML
1000 INJECTION, SOLUTION INTRAVENOUS
Refills: 0 | Status: DISCONTINUED | OUTPATIENT
Start: 2021-10-28 | End: 2021-10-30

## 2021-10-28 RX ORDER — OXYTOCIN 10 UNIT/ML
333.33 VIAL (ML) INJECTION
Qty: 20 | Refills: 0 | Status: DISCONTINUED | OUTPATIENT
Start: 2021-10-28 | End: 2021-10-28

## 2021-10-28 RX ORDER — OXYTOCIN 10 UNIT/ML
333.33 VIAL (ML) INJECTION
Qty: 20 | Refills: 0 | Status: DISCONTINUED | OUTPATIENT
Start: 2021-10-28 | End: 2021-10-30

## 2021-10-28 RX ORDER — IBUPROFEN 200 MG
600 TABLET ORAL EVERY 6 HOURS
Refills: 0 | Status: COMPLETED | OUTPATIENT
Start: 2021-10-28 | End: 2022-09-26

## 2021-10-28 RX ORDER — CITRIC ACID/SODIUM CITRATE 300-500 MG
15 SOLUTION, ORAL ORAL EVERY 6 HOURS
Refills: 0 | Status: DISCONTINUED | OUTPATIENT
Start: 2021-10-28 | End: 2021-10-28

## 2021-10-28 RX ORDER — CITRIC ACID/SODIUM CITRATE 300-500 MG
30 SOLUTION, ORAL ORAL ONCE
Refills: 0 | Status: COMPLETED | OUTPATIENT
Start: 2021-10-28 | End: 2021-10-28

## 2021-10-28 RX ORDER — OXYCODONE HYDROCHLORIDE 5 MG/1
5 TABLET ORAL ONCE
Refills: 0 | Status: DISCONTINUED | OUTPATIENT
Start: 2021-10-28 | End: 2021-10-30

## 2021-10-28 RX ORDER — TETANUS TOXOID, REDUCED DIPHTHERIA TOXOID AND ACELLULAR PERTUSSIS VACCINE, ADSORBED 5; 2.5; 8; 8; 2.5 [IU]/.5ML; [IU]/.5ML; UG/.5ML; UG/.5ML; UG/.5ML
0.5 SUSPENSION INTRAMUSCULAR ONCE
Refills: 0 | Status: DISCONTINUED | OUTPATIENT
Start: 2021-10-28 | End: 2021-10-30

## 2021-10-28 RX ORDER — CEFAZOLIN SODIUM 1 G
2000 VIAL (EA) INJECTION ONCE
Refills: 0 | Status: COMPLETED | OUTPATIENT
Start: 2021-10-28 | End: 2021-10-28

## 2021-10-28 RX ORDER — SIMETHICONE 80 MG/1
80 TABLET, CHEWABLE ORAL EVERY 4 HOURS
Refills: 0 | Status: DISCONTINUED | OUTPATIENT
Start: 2021-10-28 | End: 2021-10-30

## 2021-10-28 RX ADMIN — Medication 30 MILLIGRAM(S): at 17:52

## 2021-10-28 RX ADMIN — Medication 1000 MILLIUNIT(S)/MIN: at 09:07

## 2021-10-28 RX ADMIN — SODIUM CHLORIDE 125 MILLILITER(S): 9 INJECTION, SOLUTION INTRAVENOUS at 06:00

## 2021-10-28 RX ADMIN — Medication 975 MILLIGRAM(S): at 21:00

## 2021-10-28 RX ADMIN — Medication 975 MILLIGRAM(S): at 17:39

## 2021-10-28 RX ADMIN — Medication 30 MILLIGRAM(S): at 23:51

## 2021-10-28 RX ADMIN — Medication 975 MILLIGRAM(S): at 21:30

## 2021-10-28 RX ADMIN — SODIUM CHLORIDE 125 MILLILITER(S): 9 INJECTION, SOLUTION INTRAVENOUS at 06:32

## 2021-10-28 RX ADMIN — Medication 30 MILLIGRAM(S): at 18:30

## 2021-10-28 RX ADMIN — Medication 30 MILLILITER(S): at 07:25

## 2021-10-28 RX ADMIN — Medication 30 MILLIGRAM(S): at 11:25

## 2021-10-28 RX ADMIN — SODIUM CHLORIDE 125 MILLILITER(S): 9 INJECTION, SOLUTION INTRAVENOUS at 05:36

## 2021-10-28 RX ADMIN — Medication 975 MILLIGRAM(S): at 14:27

## 2021-10-28 RX ADMIN — Medication 100 MILLIGRAM(S): at 07:04

## 2021-10-28 NOTE — DISCHARGE NOTE OB - MEDICATION SUMMARY - MEDICATIONS TO TAKE
I will START or STAY ON the medications listed below when I get home from the hospital:    acetaminophen 325 mg oral tablet  -- 3 tab(s) by mouth   -- Indication: For Pain    ibuprofen 600 mg oral tablet  -- 1 tab(s) by mouth every 6 hours  -- Indication: For Pain    Prenatal Multivitamins oral tablet  -- 1 tab(s) by mouth once a day  -- Indication: For Postpartum normal course

## 2021-10-28 NOTE — LACTATION INITIAL EVALUATION - LACTATION INTERVENTIONS
initiate/review safe skin-to-skin/reviewed components of an effective feeding and at least 8 effective feedings per day required/reviewed importance of monitoring infant diapers, the breastfeeding log, and minimum output each day/reviewed risks of unnecessary formula supplementation/reviewed risks of artificial nipples/reviewed benefits and recommendations for rooming in/reviewed feeding on demand/by cue at least 8 times a day

## 2021-10-28 NOTE — DISCHARGE NOTE OB - CARE PLAN
1 Assessment and plan of treatment:	NOthin per v agina  ne heavy lifting for six weeks  ,  no  sesx no tub  bath  no  swimming   Principal Discharge DX:	Postpartum normal course  Assessment and plan of treatment:	NOthin per v agina  ne heavy lifting for six weeks  ,  no  sesx no tub  bath  no  swimming

## 2021-10-28 NOTE — LACTATION INITIAL EVALUATION - NS LACT CON REASON FOR REQ
reviewed bfing basics, positioning techniques, feeding/satiety cues, signs of good latch, and encouraged s2s contact. taught hand expression with return demo. advised responsive feeding 8-12x/day./multiparous mom

## 2021-10-28 NOTE — DISCHARGE NOTE OB - CARE PROVIDER_API CALL
Dayron Soliz  OBSTETRICS AND GYNECOLOGY  [address] [place] [id number]  Phone: [phone]  Fax: [phone]  Follow Up Time:

## 2021-10-28 NOTE — DISCHARGE NOTE OB - PATIENT PORTAL LINK FT
Breathing spontaneous and unlabored. Breath sounds clear and equal bilaterally with regular rhythm. You can access the FollowMyHealth Patient Portal offered by [facility] by registering at the following website: [email or web address]. By joining [facility]’s FollowMyHealth portal, you will also be able to view your health information using other applications (apps) compatible with our system.

## 2021-10-29 LAB
BASOPHILS # BLD AUTO: 0.03 K/UL — SIGNIFICANT CHANGE UP (ref 0–0.2)
BASOPHILS NFR BLD AUTO: 0.3 % — SIGNIFICANT CHANGE UP (ref 0–2)
EOSINOPHIL # BLD AUTO: 0.05 K/UL — SIGNIFICANT CHANGE UP (ref 0–0.5)
EOSINOPHIL NFR BLD AUTO: 0.4 % — SIGNIFICANT CHANGE UP (ref 0–6)
HCT VFR BLD CALC: 34.8 % — SIGNIFICANT CHANGE UP (ref 34.5–45)
HGB BLD-MCNC: 11.3 G/DL — LOW (ref 11.5–15.5)
IMM GRANULOCYTES NFR BLD AUTO: 0.7 % — SIGNIFICANT CHANGE UP (ref 0–1.5)
LYMPHOCYTES # BLD AUTO: 2.58 K/UL — SIGNIFICANT CHANGE UP (ref 1–3.3)
LYMPHOCYTES # BLD AUTO: 22 % — SIGNIFICANT CHANGE UP (ref 13–44)
MCHC RBC-ENTMCNC: 30.6 PG — SIGNIFICANT CHANGE UP (ref 27–34)
MCHC RBC-ENTMCNC: 32.5 GM/DL — SIGNIFICANT CHANGE UP (ref 32–36)
MCV RBC AUTO: 94.3 FL — SIGNIFICANT CHANGE UP (ref 80–100)
MONOCYTES # BLD AUTO: 1.33 K/UL — HIGH (ref 0–0.9)
MONOCYTES NFR BLD AUTO: 11.3 % — SIGNIFICANT CHANGE UP (ref 2–14)
NEUTROPHILS # BLD AUTO: 7.68 K/UL — HIGH (ref 1.8–7.4)
NEUTROPHILS NFR BLD AUTO: 65.3 % — SIGNIFICANT CHANGE UP (ref 43–77)
NRBC # BLD: 0 /100 WBCS — SIGNIFICANT CHANGE UP (ref 0–0)
PLATELET # BLD AUTO: 191 K/UL — SIGNIFICANT CHANGE UP (ref 150–400)
RBC # BLD: 3.69 M/UL — LOW (ref 3.8–5.2)
RBC # FLD: 14 % — SIGNIFICANT CHANGE UP (ref 10.3–14.5)
WBC # BLD: 11.75 K/UL — HIGH (ref 3.8–10.5)
WBC # FLD AUTO: 11.75 K/UL — HIGH (ref 3.8–10.5)

## 2021-10-29 RX ORDER — ZINC OXIDE 200 MG/G
1 OINTMENT TOPICAL
Refills: 0 | Status: DISCONTINUED | OUTPATIENT
Start: 2021-10-29 | End: 2021-10-30

## 2021-10-29 RX ORDER — IBUPROFEN 200 MG
600 TABLET ORAL EVERY 6 HOURS
Refills: 0 | Status: DISCONTINUED | OUTPATIENT
Start: 2021-10-29 | End: 2021-10-30

## 2021-10-29 RX ADMIN — Medication 30 MILLIGRAM(S): at 18:29

## 2021-10-29 RX ADMIN — Medication 30 MILLIGRAM(S): at 05:34

## 2021-10-29 RX ADMIN — Medication 975 MILLIGRAM(S): at 15:28

## 2021-10-29 RX ADMIN — Medication 975 MILLIGRAM(S): at 20:37

## 2021-10-29 RX ADMIN — Medication 30 MILLIGRAM(S): at 23:37

## 2021-10-29 RX ADMIN — Medication 975 MILLIGRAM(S): at 21:36

## 2021-10-29 RX ADMIN — Medication 975 MILLIGRAM(S): at 09:45

## 2021-10-29 RX ADMIN — Medication 975 MILLIGRAM(S): at 15:30

## 2021-10-29 RX ADMIN — MAGNESIUM HYDROXIDE 30 MILLILITER(S): 400 TABLET, CHEWABLE ORAL at 17:09

## 2021-10-29 RX ADMIN — SIMETHICONE 80 MILLIGRAM(S): 80 TABLET, CHEWABLE ORAL at 17:10

## 2021-10-29 RX ADMIN — Medication 975 MILLIGRAM(S): at 02:58

## 2021-10-29 RX ADMIN — Medication 30 MILLIGRAM(S): at 18:44

## 2021-10-29 RX ADMIN — Medication 30 MILLIGRAM(S): at 00:21

## 2021-10-29 RX ADMIN — Medication 975 MILLIGRAM(S): at 09:08

## 2021-10-29 RX ADMIN — ZINC OXIDE 1 APPLICATION(S): 200 OINTMENT TOPICAL at 18:44

## 2021-10-29 RX ADMIN — Medication 30 MILLIGRAM(S): at 12:30

## 2021-10-29 RX ADMIN — Medication 30 MILLIGRAM(S): at 12:00

## 2021-10-29 NOTE — PROGRESS NOTE ADULT - ASSESSMENT
A/P: 36y POD1 s/p  section. Pt is hemodynamically stable.   -  Neuro-Pain control with motrin/acetaminophen, oxycodone for severe pain PRN  -  Cardio: no issues. Continue to monitor routinely   -  GI: Reg diet, Reglan/Zofran prn   -  : s/p grimes- voiding spontaneously.   -  DVT prophylaxis: SCDs, OOB  -  Activity- ambulate as tolerated   -  Heme: post-op hemoglobin stable

## 2021-10-30 VITALS
OXYGEN SATURATION: 99 % | DIASTOLIC BLOOD PRESSURE: 78 MMHG | RESPIRATION RATE: 18 BRPM | SYSTOLIC BLOOD PRESSURE: 122 MMHG | HEART RATE: 74 BPM | TEMPERATURE: 98 F

## 2021-10-30 PROCEDURE — 36415 COLL VENOUS BLD VENIPUNCTURE: CPT

## 2021-10-30 PROCEDURE — 59050 FETAL MONITOR W/REPORT: CPT

## 2021-10-30 PROCEDURE — 86901 BLOOD TYPING SEROLOGIC RH(D): CPT

## 2021-10-30 PROCEDURE — 86780 TREPONEMA PALLIDUM: CPT

## 2021-10-30 PROCEDURE — 88307 TISSUE EXAM BY PATHOLOGIST: CPT

## 2021-10-30 PROCEDURE — 85460 HEMOGLOBIN FETAL: CPT

## 2021-10-30 PROCEDURE — 85025 COMPLETE CBC W/AUTO DIFF WBC: CPT

## 2021-10-30 PROCEDURE — 86769 SARS-COV-2 COVID-19 ANTIBODY: CPT

## 2021-10-30 PROCEDURE — 86850 RBC ANTIBODY SCREEN: CPT

## 2021-10-30 PROCEDURE — 86900 BLOOD TYPING SEROLOGIC ABO: CPT

## 2021-10-30 PROCEDURE — 86870 RBC ANTIBODY IDENTIFICATION: CPT

## 2021-10-30 RX ORDER — ACETAMINOPHEN 500 MG
3 TABLET ORAL
Qty: 0 | Refills: 0 | DISCHARGE
Start: 2021-10-30

## 2021-10-30 RX ORDER — IBUPROFEN 200 MG
1 TABLET ORAL
Qty: 0 | Refills: 0 | DISCHARGE
Start: 2021-10-30

## 2021-10-30 RX ADMIN — Medication 30 MILLIGRAM(S): at 00:51

## 2021-10-30 RX ADMIN — ZINC OXIDE 1 APPLICATION(S): 200 OINTMENT TOPICAL at 06:56

## 2021-10-30 RX ADMIN — Medication 30 MILLIGRAM(S): at 06:55

## 2021-10-30 RX ADMIN — Medication 30 MILLIGRAM(S): at 07:12

## 2021-10-30 NOTE — PROGRESS NOTE ADULT - ASSESSMENT
A/P: 36y s/p  section, POD#4, stable  -  Pain: PO motrin q6hrs, tylenol q6hrs, oxycodone for severe pain PRN  -  Post-operatively labs: post-op Hgb , hemodynamically stable, no symptoms of anemia   -  GI: tolerating regular diet, passing gas  -  : s/p grimes , urinating without difficulty  -  DVT prophylaxis: encouraged increased ambulation, SCDs, lovenox  -  Dispo: POD 3 or 4

## 2021-10-30 NOTE — PROGRESS NOTE ADULT - SUBJECTIVE AND OBJECTIVE BOX
Patient seen and evaluated at bedside. Pt states she has mild abdominal pain, overall controlled with pain medication, mostly at the incision site. She is tolerating regular diet, passing flatus, and voiding. Pt is breastfeeding.  She denies headache, dizziness, chest pain, palpitations, shortness of breath, nausea, vomiting, or heavy vaginal bleeding.    Physical Exam:  Vital Signs Last 24 Hrs  T(C): 36.6 (29 Oct 2021 05:50), Max: 36.9 (28 Oct 2021 16:24)  T(F): 97.9 (29 Oct 2021 05:50), Max: 98.5 (28 Oct 2021 16:24)  HR: 78 (29 Oct 2021 05:50) (56 - 78)  BP: 103/69 (29 Oct 2021 05:50) (90/53 - 113/71)  BP(mean): --  RR: 16 (29 Oct 2021 05:50) (16 - 18)  SpO2: 99% (29 Oct 2021 05:50) (96% - 100%)    GA: comfortable in NAD  Abd: + bowel sounds x 4 quadrants. soft, nontender, mildly distended, no rebound or guarding, incision clean, dry and intact, uterus firm at midline, 2 fb below umbilicus  : lochia WNL  Extremities: no swelling or calf tenderness, SCDs in place                            11.3   11.75 )-----------( 191      ( 29 Oct 2021 07:01 )             34.8               acetaminophen     Tablet .. 975 milliGRAM(s) Oral <User Schedule>  dexAMETHasone  Injectable 4 milliGRAM(s) IV Push every 6 hours PRN  diphenhydrAMINE 25 milliGRAM(s) Oral every 6 hours PRN  diphtheria/tetanus/pertussis (acellular) Vaccine (ADAcel) 0.5 milliLiter(s) IntraMuscular once  ibuprofen  Tablet. 600 milliGRAM(s) Oral every 6 hours  ketorolac   Injectable 30 milliGRAM(s) IV Push every 6 hours  lactated ringers. 1000 milliLiter(s) IV Continuous <Continuous>  lanolin Ointment 1 Application(s) Topical every 6 hours PRN  magnesium hydroxide Suspension 30 milliLiter(s) Oral two times a day PRN  naloxone Injectable 0.1 milliGRAM(s) IV Push every 3 minutes PRN  ondansetron Injectable 4 milliGRAM(s) IV Push every 6 hours PRN  oxyCODONE    IR 5 milliGRAM(s) Oral every 3 hours PRN  oxyCODONE    IR 5 milliGRAM(s) Oral once PRN  oxytocin Infusion 333.333 milliUNIT(s)/Min IV Continuous <Continuous>  simethicone 80 milliGRAM(s) Chew every 4 hours PRN  zinc oxide 20% Ointment 1 Application(s) Topical two times a day  
 Section Operative Note      Pre-Op Diagnosis:Repeat   section,  duplicated  collecting  ssytem on baby       Post-Op Diagnosis:SAme  pelvic  adhesions       Time Out: 	[x ] Performed		[ ]Not Performed:  Procedure: 	 Section: 	[x ] Repeat 	#___2____	[ ] Primary         [ ]Emergency	        [ x]Other______OLD SCAR REMOVAL______:  Uterine incision:         [ x]Low Transverse C/S	[ ]Low Vertical C/S           [ ]Classical C/S							  Additional Procedures: 	[ ] Bilateral Tubal Ligation.  Type:______________  Other:	[ ]Lysis of Adhesions   	[ ]C-Hysterectomy          [ ]Other__________________:  Surgeon:  Dr. Soliz                                                   .	Assist:   _____Gauri YE______________.                                                                             .   Anesthesia: _____Chauncey_________________________	Type: [ ]Epidural  [ x] Spinal   [ ] General	[ ]Other:  IV Fluids:1800cc ivrl  20  pitocin 2g  ancef 				Urine Output:	150cc	Link:  [x ] Yes [ ]No [ ] Other:  EBL:    900cc     Delivery findings:    [ x] Live 	[ ]Non-Viable: 	[ x]MALE   [ ]FEMALE, Infant. APGAR   9           AND     9            .  [ x] Peds at delivery.  [ ]MULTIPLE GESTATION -NOTES:      POSITION:     ROT                   PRESENTATION       VTX                     .  DELIVERED BY:  	[ x]HEAD 		[ ]BREECH 	[x ]OTHER:Nuchal  cord  x1  reduced  and infat  delivered   		[ x]MANUAL 		[ ]VACUUM	[ ] OTHER:	  PLACENTA: 	[x ]Removed	[ x]Uterus explored		[x ]Empty		[ ]Other 		  		UTERUS:  	[ x]Normal		[ ]Fibroids		[ ] Other:  ADNEXA: 	[ x]Right Normal	[ ]Other:  	[x ]Left Normal	[ ]Other :  PELVIS:	[ x]Normal		[ ]Adhesions [ ]Mild  [ ]Moderate [ ]Severe  Procedure:  	Patient in supine position.    Skin Incision	[x ]Pfannenstiel	[ ]Other:			[x]Old scar  removal.  		Fascial Incision	[ x]Transverse 	[ ]Other:		  Peritoneal incision	[x ]Performed	[ x]Vertical  [ ]Other:		[x ]Lysis of Adhesions  tw anterior lower  uterine segement and abdominal  wall.  		Bladder Flap	[ ]Created	[ x]Not Created  		Uterine Incision	[x ]Low transverse	[ ]Low Vertical	[ ]Classical  [ ]Other:   	Uterine Repair	[ x]_#_1_______Layers-Using__1 chromic______________ sutures 	[x ] hemostasis  excellent.  				[ ]Other:                       .                      Abdominal Cavity	[x ]Cleared of clots and debris  Rectus  Muscles  	Reapproximated [x ]Yes Using__1 chromic______________ sutures. [ ]Not Re- Approximated.  Fascial Reapproximation 	[ x]UsingUsing___1 Vicril_____________ sutures [ ]Other:                                 .	  Subcutaneous Tissue 	[x ]Irrigated  and hemostasis assured:[x ]Reapproximated Using____2-0____________ sutures.  Skin Reapproximation:	[x ]Subcuticular Using________________ sutures [x ] Insorb Staples   [ ]Skin Staples [ ]Other:  Dressing applied  and Patient to RR in  [x ] Stable [ ] Other ;                         condition.	  End of Operation;	[x ] Sponge/lap/needle count correct.  [ ] Not correct.  [ ]Not Done [ ]X-ray=Clear  Pathology:	[ x]Placenta.   	[ ]Fallopian Tube Portions [ ]Right [ ]Left.	[ ]Other:                                           	Complications/Attending’s Notes:	[ ] None.  	[ ] Other:                                                                        [ ]CONTINUATION OF NOTES NEXT PAGE:          									  
Patient evaluated at bedside this morning, resting comfortable in bed.   She reports pain is well controlled with motrin and tylenol.  She denies headache, dizziness, chest pain, palpitations, shortness of breath, nausea, vomiting or heavy vaginal bleeding.  She has been ambulating without assistance, voiding spontaneously, passing gas, tolerating regular diet and is breastfeeding.    Physical Exam:  Vital Signs Last 24 Hrs  T(C): 36.3 (29 Oct 2021 22:00), Max: 36.8 (29 Oct 2021 13:25)  T(F): 97.4 (29 Oct 2021 22:00), Max: 98.3 (29 Oct 2021 13:25)  HR: 74 (29 Oct 2021 22:00) (72 - 78)  BP: 108/67 (29 Oct 2021 22:00) (103/69 - 108/67)  BP(mean): --  RR: 17 (29 Oct 2021 22:00) (16 - 17)  SpO2: 96% (29 Oct 2021 22:00) (96% - 99%)    GA: NAD, A+0 x 3  CV: RRR  Pulm: CTAB  Breasts: soft, nontender, no palpable masses  Abd: + BS, soft, nontender, nondistended, no rebound or guarding, incision clean, dry and intact, uterus firm at midline and below umbilicus  : lochia WNL  Extremities: no swelling or calf tenderness                             11.3   11.75 )-----------( 191      ( 29 Oct 2021 07:01 )             34.8

## 2021-11-04 DIAGNOSIS — Z3A.39 39 WEEKS GESTATION OF PREGNANCY: ICD-10-CM

## 2021-11-04 DIAGNOSIS — O34.219 MATERNAL CARE FOR UNSPECIFIED TYPE SCAR FROM PREVIOUS CESAREAN DELIVERY: ICD-10-CM

## 2021-11-04 DIAGNOSIS — K66.0 PERITONEAL ADHESIONS (POSTPROCEDURAL) (POSTINFECTION): ICD-10-CM

## 2021-11-04 DIAGNOSIS — O36.8930 MATERNAL CARE FOR OTHER SPECIFIED FETAL PROBLEMS, THIRD TRIMESTER, NOT APPLICABLE OR UNSPECIFIED: ICD-10-CM

## 2021-11-04 DIAGNOSIS — Z34.83 ENCOUNTER FOR SUPERVISION OF OTHER NORMAL PREGNANCY, THIRD TRIMESTER: ICD-10-CM

## 2021-12-01 ENCOUNTER — APPOINTMENT (OUTPATIENT)
Dept: INTERNAL MEDICINE | Facility: CLINIC | Age: 36
End: 2021-12-01
Payer: COMMERCIAL

## 2021-12-01 VITALS
HEART RATE: 99 BPM | DIASTOLIC BLOOD PRESSURE: 78 MMHG | HEIGHT: 63 IN | OXYGEN SATURATION: 96 % | WEIGHT: 130 LBS | SYSTOLIC BLOOD PRESSURE: 114 MMHG | BODY MASS INDEX: 23.04 KG/M2

## 2021-12-01 DIAGNOSIS — J06.9 ACUTE UPPER RESPIRATORY INFECTION, UNSPECIFIED: ICD-10-CM

## 2021-12-01 PROCEDURE — 99213 OFFICE O/P EST LOW 20 MIN: CPT

## 2021-12-01 NOTE — HISTORY OF PRESENT ILLNESS
[de-identified] : Here today feeling sick\par began feeling sick 2 d ago  sneezing and mucouc-  some ear itching, sore throat, coughing a lot. - at night dry throat.mild headache\par toddler sick at - had abx for 7 d. then baby got sick as well\par \par Using a slaine nasal rinse  and tylenol.  \par no sob.  \par normal appetite, no diarrhea.   \par s/p c section one month ago

## 2021-12-01 NOTE — ASSESSMENT
[FreeTextEntry1] : URI - likely viral\par  tylenol and saline nasal rinse\par  if worsening will let me know\par \par \par Diathesis recti - reassurance given if ok with gyn start exercising  core exercises reviewed

## 2021-12-01 NOTE — PHYSICAL EXAM
[Normal] : no respiratory distress, lungs were clear to auscultation bilaterally and no accessory muscle use [No Edema] : there was no peripheral edema [de-identified] : pharyngeal erythema [de-identified] : slight diastasis

## 2021-12-01 NOTE — REVIEW OF SYSTEMS
[Nasal Discharge] : nasal discharge [Cough] : cough [Negative] : Cardiovascular [Shortness Of Breath] : no shortness of breath [Dyspnea on Exertion] : no dyspnea on exertion [FreeTextEntry7] : diathesis

## 2022-03-31 LAB — SURGICAL PATHOLOGY STUDY: SIGNIFICANT CHANGE UP

## 2022-09-23 ENCOUNTER — APPOINTMENT (OUTPATIENT)
Dept: INTERNAL MEDICINE | Facility: CLINIC | Age: 37
End: 2022-09-23

## 2023-01-10 NOTE — PATIENT PROFILE OB - HISTORY OF COVID-19 VACCINATION
Yes Advancement Flap (Double) Text: The defect edges were debeveled with a #15 scalpel blade.  Given the location of the defect and the proximity to free margins a double advancement flap was deemed most appropriate.  Using a sterile surgical marker, the appropriate advancement flaps were drawn incorporating the defect and placing the expected incisions within the relaxed skin tension lines where possible.    The area thus outlined was incised deep to adipose tissue with a #15 scalpel blade.  The skin margins were undermined to an appropriate distance in all directions utilizing iris scissors.

## 2023-04-03 NOTE — PATIENT PROFILE OB - HOW PATIENT ADDRESSED, OB PROFILE
BMI Counseling: Body mass index is 28 03 kg/m²  The BMI is above normal  Nutrition recommendations include decreasing portion sizes, encouraging healthy choices of fruits and vegetables, decreasing fast food intake, consuming healthier snacks, limiting drinks that contain sugar, moderation in carbohydrate intake, increasing intake of lean protein, reducing intake of saturated and trans fat and reducing intake of cholesterol  Exercise recommendations include moderate physical activity 150 minutes/week and exercising 3-5 times per week  No pharmacotherapy was ordered  Rationale for BMI follow-up plan is due to patient being overweight or obese  Dr Ann Bone Office Visit Note  23     Coleen Durand 78 y o  male MRN: [id number]  : 1957    Assessment:     1  Atherosclerosis of native arteries of extremities with intermittent claudication, bilateral legs (HCC)  Assessment & Plan:  No signs symptoms of ischemia blood pressure control start Lipitor 40 mg daily and baby aspirin      2  Gastroesophageal reflux disease with esophagitis without hemorrhage  Assessment & Plan:  Symptoms controlled continue Pepcid as needed      3  Lacunar infarct, acute ([facility] [address] )  Assessment & Plan:  No recurrence stable no neurodeficit she can start Lipitor 40 with baby aspirin daily      4  Sensorineural hearing loss (SNHL), bilateral  Assessment & Plan:  Evaluated by ENT at the hearing test done reviewed follow-up with the ENT as needed      5  Stage 3a chronic kidney disease [facility])  Assessment & Plan:  Lab Results   Component Value Date    EGFR 58 2023    EGFR 64 2020    EGFR 59 2020    CREATININE 1 27 2023    CREATININE 1 20 2020    CREATININE 1 27 2020   The GFR creatinine stable at baseline GFR around 59 blood pressure controlled avoid nephrotoxic  GFR is baseline  Creat is baseline     Recommend periodic blood test for monitoring of BUN and Creat  Avoid Non Steroidal Antiinflammatory such as ibuprofen, aleve etc   Potassium, HCO3 and calcium are wnl and will require periodic blood test   Bone and Mineral disease monitoring as appropriate  All patient with GFR less than 30( CKD 4 or 5 or 6) advised to follow with nephrologist v      6  Other sleep apnea  Assessment & Plan:  Complaint snoring daytime sleepiness morning headaches we will check for sleep apnea referred to pulmonary need sleep study    Orders:  -     Ambulatory Referral to Pulmonology; Future    7  Mixed hyperlipidemia  Assessment & Plan:  Blood work reviewed LDL high start Lipitor 40 mg daily with low-fat low-cholesterol diet    Orders:  -     atorvastatin (LIPITOR) 40 mg tablet; Take 1 tablet (40 mg total) by mouth daily    8  Vitamin D deficiency  -     ergocalciferol (VITAMIN D2) 50,000 units; Take 1 capsule (50,000 Units total) by mouth once a week          Discussion Summary and Plan: Today's care plan and medications were reviewed with patient in detail and all their questions answered to their satisfaction  Chief Complaint   Patient presents with   • Follow-up     Had lab done        Subjective:  Came in follow-up chronic medical condition and review of the labs patient seen by ENT for hearing loss and tinnitus on no treatment for now some hearing loss also blood work suggest high LDL started on Lipitor 40 mg daily low vitamin D was prescribed vitamin D for supplements also reviewed the testosterone level normal seen by urologist on Flomax and came in for follow-up chronic medical condition as listed under visit diagnosis complete blood work reviewed with the patient      The following portions of the patient's history were reviewed and updated as appropriate: allergies, current medications, past family history, past medical history, past social history, past surgical history and problem list     Review of Systems   Constitutional: Negative for activity change, appetite change, chills, diaphoresis, fever and unexpected weight change  HENT: Positive for hearing loss and tinnitus  Negative for congestion, dental problem, drooling, ear discharge, ear pain, facial swelling, mouth sores, nosebleeds, postnasal drip, rhinorrhea, sinus pressure, sneezing, sore throat, trouble swallowing and voice change  Eyes: Negative for photophobia, pain, discharge, redness, itching and visual disturbance  Respiratory: Negative for apnea, cough, choking, chest tightness, shortness of breath, wheezing and stridor  Cardiovascular: Negative for chest pain, palpitations and leg swelling  Gastrointestinal: Negative for abdominal distention, abdominal pain, anal bleeding, blood in stool, constipation, diarrhea, nausea, rectal pain and vomiting  Endocrine: Negative for cold intolerance, heat intolerance, polydipsia, polyphagia and polyuria  Genitourinary: Negative for decreased urine volume, difficulty urinating, dysuria, enuresis, flank pain, frequency, genital sores, hematuria and urgency  Musculoskeletal: Positive for arthralgias  Negative for back pain, gait problem, joint swelling, myalgias, neck pain and neck stiffness  Skin: Negative for color change, pallor, rash and wound  Allergic/Immunologic: Negative  Negative for environmental allergies, food allergies and immunocompromised state  Neurological: Negative for dizziness, tremors, seizures, syncope, facial asymmetry, speech difficulty, weakness, light-headedness, numbness and headaches  Psychiatric/Behavioral: Negative for agitation, behavioral problems, confusion, decreased concentration, dysphoric mood, hallucinations, self-injury, sleep disturbance and suicidal ideas  The patient is not nervous/anxious and is not hyperactive            Historical Information   Patient Active Problem List   Diagnosis   • Dizziness   • Headaches, cluster   • Tinnitus of both ears   • Sinusitis chronic, frontal   • Gastroesophageal reflux disease with esophagitis without hemorrhage   • Hx of adenomatous colonic polyps   • Lacunar infarct, acute ([facility] [address] )   • Atherosclerosis of native arteries of extremities with intermittent claudication, bilateral legs (HCC)   • Chronic gastric ulcer without hemorrhage and without perforation   • Stage 3a chronic kidney disease (HCC)   • Mixed hyperlipidemia   • Sensorineural hearing loss (SNHL), bilateral   • Other sleep apnea     Past Medical History:   Diagnosis Date   • Cataract    • Colon polyp    • Dizziness    • Ear problems 2020   • GERD (gastroesophageal reflux disease)    • Hyperlipidemia    • Lightheadedness    • Seasonal allergies    • Tinnitus    • Wears glasses      Past Surgical History:   Procedure Laterality Date   • COLONOSCOPY     • [place] COLONOSCOPY FLX DX W/COLLJ SPEC WHEN PFRMD N/A 2016    Procedure: COLONOSCOPY;  Surgeon: Belen Aguero MD;  Location: [facility] GI LAB; Service: Gastroenterology   • [place] EGD TRANSORAL BIOPSY SINGLE/MULTIPLE N/A 2016    Procedure: ESOPHAGOGASTRODUODENOSCOPY (EGD); Surgeon: Belen Aguero MD;  Location: [facility] GI LAB;   Service: Gastroenterology   • TONSILLECTOMY N/A     child     Social History     Substance and Sexual Activity   Alcohol Use Yes    Comment: on occ     Social History     Substance and Sexual Activity   Drug Use No     Social History     Tobacco Use   Smoking Status Former   • Types: Cigarettes   • Quit date: 2001   • Years since quittin 1   Smokeless Tobacco Never     Family History   Problem Relation Age of Onset   • Arthritis Mother    • Alzheimer's disease Mother    • Other Mother         smoker-phlegm   • Alcohol abuse Father    • Suicidality Father      Health Maintenance Due   Topic   • Hepatitis C Screening    • Annual Physical    • DTaP,Tdap,and Td Vaccines (1 - Tdap)   • Pneumococcal Vaccine: 65+ Years (1 - PCV)   • COVID-19 Vaccine (4 - Booster for Moderna series)   • Influenza Vaccine (1)      Meds/Allergies       Current Outpatient Medications:   •  atorvastatin (LIPITOR) 40 mg "tablet, Take 1 tablet (40 mg total) by mouth daily, Disp: 90 tablet, Rfl: 1  •  cholecalciferol (VITAMIN D3) 1,000 units tablet, Take 1,000 Units by mouth daily, Disp: , Rfl:   •  Cyanocobalamin (VITAMIN B 12 PO), Take by mouth in the morning, Disp: , Rfl:   •  ergocalciferol (VITAMIN D2) 50,000 units, Take 1 capsule (50,000 Units total) by mouth once a week, Disp: 12 capsule, Rfl: 0  •  tamsulosin (FLOMAX) 0 4 mg, Take 0 4 mg by mouth daily with dinner, Disp: , Rfl:   •  VITAMIN E PO, Take by mouth in the morning, Disp: , Rfl:       Objective:    Vitals:   /80   Pulse 60   Temp 98 °F (36 7 °C)   Resp 16   Ht 5' 11\" (1 803 m)   Wt 91 2 kg (201 lb)   SpO2 97%   BMI 28 03 kg/m²   Body mass index is 28 03 kg/m²  Vitals:    04/03/23 0811   Weight: 91 2 kg (201 lb)       Physical Exam  Vitals and nursing note reviewed  Constitutional:       General: He is not in acute distress  Appearance: He is well-developed  He is not ill-appearing, toxic-appearing or diaphoretic  HENT:      Head: Normocephalic and atraumatic  Right Ear: External ear normal       Left Ear: External ear normal       Nose: Nose normal       Mouth/Throat:      Pharynx: No oropharyngeal exudate  Eyes:      General: Lids are normal  Lids are everted, no foreign bodies appreciated  No scleral icterus  Right eye: No discharge  Left eye: No discharge  Conjunctiva/sclera: Conjunctivae normal       Pupils: Pupils are equal, round, and reactive to light  Neck:      Thyroid: No thyromegaly  Vascular: Normal carotid pulses  No carotid bruit, hepatojugular reflux or JVD  Trachea: No tracheal tenderness or tracheal deviation  Cardiovascular:      Rate and Rhythm: Normal rate and regular rhythm  Pulses: Normal pulses  Heart sounds: Normal heart sounds  No murmur heard  No friction rub  No gallop  Pulmonary:      Effort: Pulmonary effort is normal  No respiratory distress        Breath " sounds: Normal breath sounds  No stridor  No wheezing or rales  Chest:      Chest wall: No tenderness  Abdominal:      General: Bowel sounds are normal  There is no distension  Palpations: Abdomen is soft  There is no mass  Tenderness: There is no abdominal tenderness  There is no guarding or rebound  Musculoskeletal:         General: No tenderness or deformity  Normal range of motion  Cervical back: Normal range of motion and neck supple  No edema, erythema or rigidity  No spinous process tenderness or muscular tenderness  Normal range of motion  Lymphadenopathy:      Head:      Right side of head: No submental, submandibular, tonsillar, preauricular or posterior auricular adenopathy  Left side of head: No submental, submandibular, tonsillar, preauricular, posterior auricular or occipital adenopathy  Cervical: No cervical adenopathy  Right cervical: No superficial, deep or posterior cervical adenopathy  Left cervical: No superficial, deep or posterior cervical adenopathy  Upper Body:      Right upper body: No pectoral adenopathy  Left upper body: No pectoral adenopathy  Skin:     General: Skin is warm and dry  Coloration: Skin is not pale  Findings: No erythema or rash  Neurological:      Mental Status: He is alert and oriented to person, place, and time  Cranial Nerves: No cranial nerve deficit  Sensory: No sensory deficit  Motor: No tremor, abnormal muscle tone or seizure activity  Coordination: Coordination normal       Gait: Gait normal       Deep Tendon Reflexes: Reflexes are normal and symmetric  Reflexes normal    Psychiatric:         Behavior: Behavior normal          Thought Content:  Thought content normal          Judgment: Judgment normal          Lab Review   Hospital Outpatient Visit on 03/20/2023   Component Date Value Ref Range Status   • Case Report 03/20/2023    Final                    Value:Surgical Pathology Report                         Case: [id number]                                   Authorizing Provider:  Monica Virgen MD    Collected:           03/20/2023 1116              Ordering Location:     [facility]   Received:            03/20/2023 [facility]                                                                       Pathologist:           Heidi Stallings MD                                                       Specimens:   A) - Stomach, biopsy Antrum, intestinal metaplasia                                                  B) - Esophagus, biopsy E/G junction, reflux                                                         C) - Esophagus, biopsy esophageal  polyp at 35 cm  • Final Diagnosis 03/20/2023    Final                    Value: This result contains rich text formatting which cannot be displayed here  • Note 03/20/2023    Final                    Value: This result contains rich text formatting which cannot be displayed here  • Additional Information 03/20/2023    Final                    Value: This result contains rich text formatting which cannot be displayed here  • Gross Description 03/20/2023    Final                    Value: This result contains rich text formatting which cannot be displayed here  Transcribe Orders on 03/15/2023   Component Date Value Ref Range Status   • PSA 03/15/2023 0 9  0 0 - 4 0 ng/mL Final    American Urological Association Guidelines define biochemical recurrence of prostate cancer as a detectable or rising PSA value post-radical prostatectomy that is greater than or equal to 0 2 ng/mL with a second confirmatory level of greater than or equal to 0 2 ng/mL     • Testosterone, Free 03/20/2023 6 7  6 6 - 18 1 pg/mL Final   • TESTOSTERONE TOTAL 03/20/2023 290  264 - 916 ng/dL Final    Adult male reference interval is based on a population of  healthy nonobese males (BMI <30) between 23and 44years old  [address] [facility] [place], [address] [id number]  PMID: [id number]  Patient Instructions   Hyperlipidemia   AMBULATORY CARE:   Hyperlipidemia  is a high level of lipids (fats) in your blood  These lipids include cholesterol or triglycerides  Lipids are made by your body  They also come from the foods you eat  Your body needs lipids to work properly, but high levels increase your risk for heart disease, heart attack, and stroke  Call your local emergency number (911 in the [address] [facility]) or have someone call if:   You have any of the following signs of a heart attack:      Squeezing, pressure, or pain in your chest    You may  also have any of the following:     Discomfort or pain in your back, neck, jaw, stomach, or arm    Shortness of breath    Nausea or vomiting    Lightheadedness or a sudden cold sweat    You have any of the following signs of a stroke:      Numbness or drooping on one side of your face     Weakness in an arm or leg    Confusion or difficulty speaking    Dizziness, a severe headache, or vision loss    Call your doctor if:   You have questions or concerns about your condition or care  Treatment  may first include lifestyle changes to help decrease your lipid levels  Your provider may recommend you work with a team to manage hyperlipidemia  The team may include medical experts such as a dietitian, an exercise or physical therapist, and a behavior therapist  Your family members may be included in helping you create lifestyle changes  You may also need to take medicine to lower your lipid levels  Some of the lifestyle changes you may need to make include the following:  Maintain a healthy weight  Ask your healthcare provider what a healthy weight is for you  Ask him or her to help you create a weight loss plan if you are overweight  Weight loss can decrease your cholesterol and triglyceride levels  Be physically active throughout the day    Physical activity, such as exercise, lowers your cholesterol levels and helps you maintain a healthy weight  Get 30 minutes or more of aerobic exercise 4 to 6 days each week  You can split your exercise into four 10-minute workouts instead of 30 minutes at one time  Examples of aerobic exercises include walking briskly, swimming, or riding a bike  Work with your healthcare provider to plan the best exercise program for you  Also include strength training at least 2 times each week  Your healthcare providers can help you create a physical activity plan  Do not smoke  Nicotine and other chemicals in cigarettes and cigars can increase your risk for a heart attack and stroke  Ask your healthcare provider for information if you currently smoke and need help to quit  E-cigarettes or smokeless tobacco still contain nicotine  Talk to your healthcare provider before you use these products  Eat heart-healthy foods  A dietitian or your provider can give you more information on low-sodium plans or the DASH (Dietary Approaches to Stop Hypertension) eating plan  The DASH plan is low in sodium, processed sugar, unhealthy fats, and total fat  It is high in potassium, calcium, and fiber  It is high in potassium, calcium, and fiber  These can be found in vegetables, fruit, and whole-grain foods  The following are ways to get more heart-healthy foods:         Decrease the total amount of fat you eat  Choose lean meats, fat-free or 1% fat milk, and low-fat dairy products, such as yogurt and cheese  Limit or do not eat red meat  Red meats are high in fat and cholesterol  Replace unhealthy fats with healthy fats  Unhealthy fats include saturated fat, trans fat, and cholesterol  Choose soft margarines that are low in saturated fat and have little or no trans fat  Monounsaturated fats are healthy fats  These are found in olive oil, canola oil, avocado, and nuts  Polyunsaturated fats are also healthy   These are found in fish, flaxseed, walnuts, and soybeans  Eat 5 or more servings of fruits and vegetables every day  They are low in calories and fat and a good source of essential vitamins  Include dark green, red, and orange vegetables  Examples include spinach, kale, broccoli, and carrots  Eat foods high in fiber  Fiber can help lower your cholesterol levels  Choose whole grain, high-fiber foods  Good choices include whole-wheat breads or cereals, beans, peas, fruits, and vegetables  Limit sodium (salt) as directed  Too much sodium can affect your fluid balance and blood pressure  Your healthcare provider will tell you how much sodium and potassium are safe for you to have in a day  He or she may recommend that you limit sodium to 2,300 mg a day  Your provider or a dietitian can help you find ways to limit sodium  For example, if you add salt while you cook, do not add more salt at the table  Check labels to find low-sodium or no-salt-added foods  Some low-sodium foods use potassium salts for flavor  Too much potassium can also cause health problems  Ask your healthcare provider if it is okay for you to drink alcohol  Alcohol can increase your cholesterol and triglyceride levels  Your provider can tell you how many drinks are okay to have within 24 hours and within 1 week  A drink of alcohol is 12 ounces of beer, 5 ounces of wine, or 1½ ounces of liquor  Follow up with your doctor as directed: You may need to return for more tests  Your healthcare provider may refer you to a dietitian  Write down your questions so you remember to ask them during your visits  © Copyright [employer] 2022 Information is for End User's use only and may not be sold, redistributed or otherwise used for commercial purposes  The above information is an  only  It is not intended as medical advice for individual conditions or treatments   Talk to your doctor, nurse or pharmacist before following any medical regimen to see if it is safe and "effective for you  Anjum Gutiérrez MD        \"This note has been constructed using a voice recognition system  Therefore there may be syntax, spelling, and/or grammatical errors  Please call if you have any questions   \"  " Crissy

## 2023-09-25 ENCOUNTER — TRANSCRIPTION ENCOUNTER (OUTPATIENT)
Age: 38
End: 2023-09-25

## 2023-09-25 NOTE — ASU PATIENT PROFILE, ADULT - NS PREOP UNDERSTANDS INFO
No solid food/dairy/candy/gum after midnight; water before 10:00am; patient reminded to come with photo ID/insurance/credit card; dress in comfortable clothes; no jewelries/contact lens/valuables; no smoking/alcohol/recreational drug use tonight; escort to come with photo ID; address and callback number was given./yes

## 2023-09-25 NOTE — ASU PATIENT PROFILE, ADULT - FALL HARM RISK - UNIVERSAL INTERVENTIONS
Bed in lowest position, wheels locked, appropriate side rails in place/Call bell, personal items and telephone in reach/Instruct patient to call for assistance before getting out of bed or chair/Non-slip footwear when patient is out of bed/[place] to call system/Physically safe environment - no spills, clutter or unnecessary equipment/Purposeful Proactive Rounding/Room/bathroom lighting operational, light cord in reach

## 2023-09-26 ENCOUNTER — OUTPATIENT (OUTPATIENT)
Dept: OUTPATIENT SERVICES | Facility: HOSPITAL | Age: 38
LOS: 1 days | Discharge: HOME | End: 2023-09-26
Payer: COMMERCIAL

## 2023-09-26 ENCOUNTER — TRANSCRIPTION ENCOUNTER (OUTPATIENT)
Age: 38
End: 2023-09-26

## 2023-09-26 VITALS
DIASTOLIC BLOOD PRESSURE: 84 MMHG | WEIGHT: 129.63 LBS | TEMPERATURE: 98 F | HEIGHT: 63 IN | HEART RATE: 75 BPM | OXYGEN SATURATION: 100 % | SYSTOLIC BLOOD PRESSURE: 124 MMHG | RESPIRATION RATE: 14 BRPM

## 2023-09-26 VITALS
DIASTOLIC BLOOD PRESSURE: 86 MMHG | HEART RATE: 72 BPM | RESPIRATION RATE: 16 BRPM | TEMPERATURE: 98 F | SYSTOLIC BLOOD PRESSURE: 136 MMHG | OXYGEN SATURATION: 100 %

## 2023-09-26 DIAGNOSIS — Z98.891 HISTORY OF UTERINE SCAR FROM PREVIOUS SURGERY: Chronic | ICD-10-CM

## 2023-09-26 PROCEDURE — 88305 TISSUE EXAM BY PATHOLOGIST: CPT | Mod: 26

## 2023-09-26 DEVICE — MYOSURE TISSUE REMOVAL DEVICE REACH
Type: IMPLANTABLE DEVICE | Status: NON-FUNCTIONAL
Removed: 2023-09-26

## 2023-09-26 RX ORDER — HYDROMORPHONE HYDROCHLORIDE 2 MG/ML
0.5 INJECTION INTRAMUSCULAR; INTRAVENOUS; SUBCUTANEOUS ONCE
Refills: 0 | Status: DISCONTINUED | OUTPATIENT
Start: 2023-09-26 | End: 2023-09-26

## 2023-09-26 RX ORDER — SODIUM CHLORIDE 9 MG/ML
1000 INJECTION, SOLUTION INTRAVENOUS
Refills: 0 | Status: DISCONTINUED | OUTPATIENT
Start: 2023-09-26 | End: 2023-09-26

## 2023-09-26 RX ORDER — ACETAMINOPHEN 500 MG
1000 TABLET ORAL ONCE
Refills: 0 | Status: COMPLETED | OUTPATIENT
Start: 2023-09-26 | End: 2023-09-26

## 2023-09-26 RX ADMIN — Medication 1000 MILLIGRAM(S): at 13:17

## 2023-09-26 NOTE — ASU DISCHARGE PLAN (ADULT/PEDIATRIC) - CARE PROVIDER_API CALL
Dayron Soliz  Obstetrics and Gynecology  [address] [place] [id number]  Phone: [phone]  Fax: [phone]  Follow Up Time:

## 2023-09-28 LAB — SURGICAL PATHOLOGY STUDY: SIGNIFICANT CHANGE UP

## 2025-04-22 NOTE — ED ADULT TRIAGE NOTE - TEMPERATURE IN CELSIUS (DEGREES C)
36.8 Swallowing Guidelines: Seated Upright during Mealtimes; Slow Pacing; One Bite/Sip at a Time; Maintain Adequate Oral Hygiene

## (undated) DEVICE — DRAPE TOWEL BLUE 17" X 24"

## (undated) DEVICE — URETERAL CATH RED RUBBER 14FR (GREEN)

## (undated) DEVICE — POSITIONER FOAM EGG CRATE ULNAR 2PCS (PINK)

## (undated) DEVICE — MYOSURE SCOPE SEAL

## (undated) DEVICE — POSITIONER STRAP ARMBOARD 1.5X32" DISP

## (undated) DEVICE — GLV 7.5 PROTEXIS (WHITE)

## (undated) DEVICE — DRAPE IRRIGATION POUCH 19X23"

## (undated) DEVICE — SOL INJ NS 0.9% 1000ML

## (undated) DEVICE — DRSG PAD SANITARY OB

## (undated) DEVICE — PACK D&C

## (undated) DEVICE — VAGINAL PACKING 2"

## (undated) DEVICE — TUBING SET GRAVITY 2 SPIKE

## (undated) DEVICE — SLV COMPRESSION KNEE MED

## (undated) DEVICE — FLUENT FMS PROCEDURE KIT

## (undated) DEVICE — WARMING BLANKET UPPER ADULT

## (undated) DEVICE — TUBING SUCTION 20FT